# Patient Record
Sex: FEMALE | Race: WHITE | Employment: FULL TIME
[De-identification: names, ages, dates, MRNs, and addresses within clinical notes are randomized per-mention and may not be internally consistent; named-entity substitution may affect disease eponyms.]

---

## 2017-07-29 ENCOUNTER — HEALTH MAINTENANCE LETTER (OUTPATIENT)
Age: 42
End: 2017-07-29

## 2018-08-05 ENCOUNTER — HEALTH MAINTENANCE LETTER (OUTPATIENT)
Age: 43
End: 2018-08-05

## 2020-02-20 NOTE — PROGRESS NOTES
Subjective     Carina George is a 44 year old female who presents to clinic today for the following health issues:    History of Present Illness        Hypothyroidism:     Since last visit, patient describes the following symptoms::  Anxiety, Fatigue, Hair loss and Weight gain    Weight gain::  11-15 lbs.    She eats 4 or more servings of fruits and vegetables daily.She consumes 1 sweetened beverage(s) daily.She exercises with enough effort to increase her heart rate 30 to 60 minutes per day.  She exercises with enough effort to increase her heart rate 6 days per week.   She is taking medications regularly.     Currently taking levothyroxine. Noting more fatigue, hair loss, weight gain/hard to lose weight with frequent exercise. Has been slowly reducing her dose of levothyroxine over the years, began at 125 mg dose and most recently at 75 mg. Also has had some stress related issues in the past. Had undergone a divorce last year and had a recent move. She is working 10-12 hour days and working 7 days a week to pay her bills. Had taken Effexor and Zoloft prior but for years has only been taking clonazepam. Last prescription for clonazepam was a few months ago and uses about once every 3 days on average. Stopped Topamax recently and has not noted a significant increase in her migraines. Denies issues with abuse of or legal issues with prescriptions. Mentions that she had seen a counselor previously, but not at the current time.     Patient denies shortness of breath and has not found a need for inhalers. She has noted some ear and eye itchiness symptoms lately. Not currently taking any allergy medication.     Answers for HPI/ROS submitted by the patient on 2/24/2020   Chronic problems general questions HPI Form  If you checked off any problems, how difficult have these problems made it for you to do your work, take care of things at home, or get along with other people?: Somewhat difficult  PHQ9 TOTAL SCORE: 5  BRAYDON  "7 TOTAL SCORE: 3    Reviewed and updated as needed this visit by Provider  Tobacco  Allergies  Meds  Problems  Med Hx  Surg Hx  Fam Hx       Review of Systems   ROS COMP: Constitutional, HEENT, cardiovascular, pulmonary, GI, , musculoskeletal, neuro, skin, endocrine and psych systems are negative, except as otherwise noted.    This document serves as a record of the services and decisions personally performed and made by Kerri Man MD. It was created on her behalf by Joe Louis, a trained medical scribe. The creation of this document is based on the provider's statements to the medical scribe.  Joe Louis 3:54 PM February 24, 2020      Objective    /74 (BP Location: Left arm, Patient Position: Chair, Cuff Size: Adult Regular)   Pulse 82   Temp 98.4  F (36.9  C) (Temporal)   Resp 16   Ht 1.715 m (5' 7.5\")   Wt 92.4 kg (203 lb 12.8 oz)   SpO2 99%   Breastfeeding No   BMI 31.45 kg/m    Body mass index is 31.45 kg/m .  Physical Exam   GENERAL: healthy, alert and no distress  HENT: normal ear canals and TM's bilaterally, pale nasal turbinates   NECK: no adenopathy, no asymmetry, masses, or scars and thyroid normal to palpation  RESP: lungs clear to auscultation - no rales, rhonchi or wheezes  CV: regular rate and rhythm, normal S1 S2, no S3 or S4, no murmur, click or rub, no peripheral edema and peripheral pulses strong  SKIN: no suspicious lesions or rashes to visible skin   PSYCH: mentation appears normal, affect normal/bright      Assessment & Plan       ICD-10-CM    1. Encounter for routine adult health examination without abnormal findings Z00.00    2. Mild persistent asthma with exacerbation J45.31    3. Tobacco abuse Z72.0 varenicline (CHANTIX CONTINUING MONTH YENY) 1 MG tablet     varenicline (CHANTIX) 1 MG tablet   4. Screening for malignant neoplasm of cervix Z12.4    5. Acquired hypothyroidism E03.9 TSH with free T4 reflex   6. Anxiety F41.9 clonazePAM (KLONOPIN) 1 MG tablet   7. " Allergic rhinitis due to animals J30.81 fluticasone (FLONASE) 50 MCG/ACT nasal spray   Patient requests chantix for smoking cessation, denies significant history of asthma and has not needed her inhalers. Given info on quit plan as it is ordered, but she has had difficulty affording meds and this will help with the cost. Recheck labs today for thyroid and follow-up with patient if needing a change in her dose of levothyroxine. Symptoms could be stress related and patient having significant stress/anxiety symptoms due to life events. Divorce, move, financial - working 2 jobs. Continue current treatment with Clonazepam but recommended that she try to keep use limited to less than 10 pills/ month and follow-up with me in 1 month regarding the use of this medication. Ideally will have another long term plan if continuing to use so much rescue. Has some allergies acting up, Flonase nasal spray to be tried.     Tobacco Cessation:   reports that she has been smoking cigarettes. She has a 10.00 pack-year smoking history. She has never used smokeless tobacco.  Tobacco Cessation Action Plan: Pharmacotherapies : Chantix    See Patient Instructions    Return in about 1 month (around 3/24/2020) for mood.    The information in this document, created by the medical scribe for me, accurately reflects the services I personally performed and the decisions made by me. I have reviewed and approved this document for accuracy prior to leaving the patient care area.  February 24, 2020 4:14 PM     Kerri Man MD, MD  [facility]

## 2020-02-24 ENCOUNTER — OFFICE VISIT (OUTPATIENT)
Dept: FAMILY MEDICINE | Facility: OTHER | Age: 45
End: 2020-02-24
Payer: COMMERCIAL

## 2020-02-24 VITALS
SYSTOLIC BLOOD PRESSURE: 118 MMHG | DIASTOLIC BLOOD PRESSURE: 74 MMHG | HEART RATE: 82 BPM | HEIGHT: 68 IN | OXYGEN SATURATION: 99 % | TEMPERATURE: 98.4 F | WEIGHT: 203.8 LBS | BODY MASS INDEX: 30.89 KG/M2 | RESPIRATION RATE: 16 BRPM

## 2020-02-24 DIAGNOSIS — F41.9 ANXIETY: Primary | ICD-10-CM

## 2020-02-24 DIAGNOSIS — E03.9 ACQUIRED HYPOTHYROIDISM: ICD-10-CM

## 2020-02-24 DIAGNOSIS — Z12.4 SCREENING FOR MALIGNANT NEOPLASM OF CERVIX: ICD-10-CM

## 2020-02-24 DIAGNOSIS — Z72.0 TOBACCO ABUSE: ICD-10-CM

## 2020-02-24 DIAGNOSIS — J45.31 MILD PERSISTENT ASTHMA WITH EXACERBATION: ICD-10-CM

## 2020-02-24 DIAGNOSIS — J30.81 ALLERGIC RHINITIS DUE TO ANIMALS: ICD-10-CM

## 2020-02-24 LAB — TSH SERPL DL<=0.005 MIU/L-ACNC: 1.16 MU/L (ref 0.4–4)

## 2020-02-24 PROCEDURE — 99203 OFFICE O/P NEW LOW 30 MIN: CPT | Performed by: FAMILY MEDICINE

## 2020-02-24 PROCEDURE — 84443 ASSAY THYROID STIM HORMONE: CPT | Performed by: FAMILY MEDICINE

## 2020-02-24 PROCEDURE — 36415 COLL VENOUS BLD VENIPUNCTURE: CPT | Performed by: FAMILY MEDICINE

## 2020-02-24 RX ORDER — CLONAZEPAM 1 MG/1
1 TABLET ORAL DAILY
Qty: 10 TABLET | Refills: 0 | Status: SHIPPED | OUTPATIENT
Start: 2020-02-24 | End: 2021-02-22

## 2020-02-24 RX ORDER — FLUTICASONE PROPIONATE 50 MCG
2 SPRAY, SUSPENSION (ML) NASAL DAILY
Qty: 16 G | Refills: 1 | Status: SHIPPED | OUTPATIENT
Start: 2020-02-24 | End: 2020-05-01

## 2020-02-24 RX ORDER — LEVOTHYROXINE SODIUM 50 UG/1
50 TABLET ORAL DAILY
COMMUNITY
Start: 2020-02-04 | End: 2021-02-22

## 2020-02-24 RX ORDER — VARENICLINE TARTRATE 1 MG/1
1 TABLET, FILM COATED ORAL 2 TIMES DAILY
Qty: 60 TABLET | Refills: 1 | Status: SHIPPED | OUTPATIENT
Start: 2020-02-24 | End: 2021-02-01

## 2020-02-24 RX ORDER — VARENICLINE TARTRATE 1 MG/1
1 TABLET, FILM COATED ORAL 2 TIMES DAILY
Qty: 60 TABLET | Refills: 0 | Status: SHIPPED | OUTPATIENT
Start: 2020-02-24 | End: 2021-02-01

## 2020-02-24 ASSESSMENT — ANXIETY QUESTIONNAIRES
GAD7 TOTAL SCORE: 3
3. WORRYING TOO MUCH ABOUT DIFFERENT THINGS: NOT AT ALL
7. FEELING AFRAID AS IF SOMETHING AWFUL MIGHT HAPPEN: NOT AT ALL
6. BECOMING EASILY ANNOYED OR IRRITABLE: NOT AT ALL
1. FEELING NERVOUS, ANXIOUS, OR ON EDGE: SEVERAL DAYS
GAD7 TOTAL SCORE: 3
7. FEELING AFRAID AS IF SOMETHING AWFUL MIGHT HAPPEN: NOT AT ALL
5. BEING SO RESTLESS THAT IT IS HARD TO SIT STILL: SEVERAL DAYS
GAD7 TOTAL SCORE: 3
2. NOT BEING ABLE TO STOP OR CONTROL WORRYING: NOT AT ALL
4. TROUBLE RELAXING: SEVERAL DAYS

## 2020-02-24 ASSESSMENT — PATIENT HEALTH QUESTIONNAIRE - PHQ9
SUM OF ALL RESPONSES TO PHQ QUESTIONS 1-9: 5
10. IF YOU CHECKED OFF ANY PROBLEMS, HOW DIFFICULT HAVE THESE PROBLEMS MADE IT FOR YOU TO DO YOUR WORK, TAKE CARE OF THINGS AT HOME, OR GET ALONG WITH OTHER PEOPLE: SOMEWHAT DIFFICULT
SUM OF ALL RESPONSES TO PHQ QUESTIONS 1-9: 5

## 2020-02-24 ASSESSMENT — MIFFLIN-ST. JEOR: SCORE: 1624.99

## 2020-02-24 NOTE — LETTER
February 26, 2020      Carina George  [address] [place] [id number]        Dear Carina,     This letter is to inform you that your most recent labs were normal.    Results for orders placed or performed in visit on 02/24/20   TSH with free T4 reflex     Status: None   Result Value Ref Range    TSH 1.16 0.40 - 4.00 mU/L       Please call the clinic if you have any questions or concerns.    Thank you,   Team

## 2020-02-24 NOTE — PATIENT INSTRUCTIONS
Wean up on the Chantix as prescribed and pick a quit date. Take a look at the quit plan information given today.     There are resources for counseling if finances were ever an issue, please let me know if wanting to follow-up with a [facility] counselor as we do have services available here as well.     Try to limit klonopin below 10 per month. Follow-up with me regarding mood symptoms in about a month.     Flonase nasal spray for the allergies is recommended.

## 2020-02-25 ASSESSMENT — ASTHMA QUESTIONNAIRES: ACT_TOTALSCORE: 25

## 2020-02-25 ASSESSMENT — ANXIETY QUESTIONNAIRES: GAD7 TOTAL SCORE: 3

## 2020-02-25 ASSESSMENT — PATIENT HEALTH QUESTIONNAIRE - PHQ9: SUM OF ALL RESPONSES TO PHQ QUESTIONS 1-9: 5

## 2020-05-01 DIAGNOSIS — J30.81 ALLERGIC RHINITIS DUE TO ANIMALS: ICD-10-CM

## 2020-05-01 RX ORDER — FLUTICASONE PROPIONATE 50 MCG
SPRAY, SUSPENSION (ML) NASAL
Qty: 16 ML | Refills: 1 | Status: SHIPPED | OUTPATIENT
Start: 2020-05-01 | End: 2020-07-01

## 2020-05-01 NOTE — TELEPHONE ENCOUNTER
Pending Prescriptions:                       Disp   Refills    fluticasone (FLONASE) 50 MCG/ACT nasal sp*16 mL  1            Sig: INSTILL 2 SPRAYS INTO BOTH NOSTRILS DAILY    Prescription approved per [facility] Refill Protocol.    Silvia Carlson, MSN, RN

## 2020-07-01 DIAGNOSIS — J30.81 ALLERGIC RHINITIS DUE TO ANIMALS: ICD-10-CM

## 2020-07-01 RX ORDER — FLUTICASONE PROPIONATE 50 MCG
SPRAY, SUSPENSION (ML) NASAL
Qty: 16 ML | Refills: 0 | Status: SHIPPED | OUTPATIENT
Start: 2020-07-01 | End: 2020-07-15

## 2020-07-15 DIAGNOSIS — J30.81 ALLERGIC RHINITIS DUE TO ANIMALS: ICD-10-CM

## 2020-07-16 RX ORDER — FLUTICASONE PROPIONATE 50 MCG
SPRAY, SUSPENSION (ML) NASAL
Qty: 16 ML | Refills: 0 | Status: SHIPPED | OUTPATIENT
Start: 2020-07-16 | End: 2021-02-22

## 2020-07-16 NOTE — TELEPHONE ENCOUNTER
Prescription approved per [facility] Refill Protocol.    Dorota Cuellar, BSN, RN, PHN

## 2020-07-19 ENCOUNTER — HOSPITAL ENCOUNTER (EMERGENCY)
Facility: CLINIC | Age: 45
Discharge: HOME | End: 2020-07-19
Attending: NURSE PRACTITIONER | Admitting: NURSE PRACTITIONER
Payer: COMMERCIAL

## 2020-07-19 VITALS
HEIGHT: 68 IN | OXYGEN SATURATION: 99 % | TEMPERATURE: 97.8 F | SYSTOLIC BLOOD PRESSURE: 123 MMHG | DIASTOLIC BLOOD PRESSURE: 86 MMHG | BODY MASS INDEX: 30.31 KG/M2 | RESPIRATION RATE: 20 BRPM | WEIGHT: 200 LBS

## 2020-07-19 DIAGNOSIS — I83.899 RUPTURED VARICOSE VEIN: ICD-10-CM

## 2020-07-19 PROCEDURE — 99284 EMERGENCY DEPT VISIT MOD MDM: CPT | Mod: Z6 | Performed by: NURSE PRACTITIONER

## 2020-07-19 PROCEDURE — 99282 EMERGENCY DEPT VISIT SF MDM: CPT | Performed by: NURSE PRACTITIONER

## 2020-07-19 ASSESSMENT — MIFFLIN-ST. JEOR: SCORE: 1615.69

## 2020-07-19 NOTE — ED AVS SNAPSHOT
[facility] Emergency Department  [address] [place] [address]  Phone:  [phone]  Fax:  [phone]                                    Carina George   MRN: [id number]    Department:  [facility] Emergency Department   Date of Visit:  7/19/2020           After Visit Summary Signature Page    I have received my discharge instructions, and my questions have been answered. I have discussed any challenges I see with this plan with the nurse or doctor.    ..........................................................................................................................................  Patient/Patient Representative Signature      ..........................................................................................................................................  Patient Representative Print Name and Relationship to Patient    ..................................................               ................................................  Date                                   Time    ..........................................................................................................................................  Reviewed by Signature/Title    ...................................................              ..............................................  Date                                               Time          22EPIC Rev 08/18

## 2020-07-20 NOTE — DISCHARGE INSTRUCTIONS
Keep compressed with an Ace wrap for the next few days  You can alternate ibuprofen/Tylenol per bottle instructions for pain.  Ice as needed.  Do not leave on for more than 20 minutes at a time every 2-4 hours.  I have given you the number for vein [facility] in [place] that specializes in varicose veins.  You can certainly follow-up with them if you continue to have problems.

## 2020-07-20 NOTE — ED PROVIDER NOTES
History     Chief Complaint   Patient presents with     Leg Injury     HPI  Carina George is a 44 year old female who presents with bruising to a varicose vein that she noticed while getting out of the shower this evening.  Patient has a history of varicose veins, there is no active bleeding externally.  Patient denies any trauma but this is occurred more spontaneously.  Patient does report sensitivity/pain to the area which is worse with palpation.  Patient called the nurse line and they instructed patient to present to the emergency room for possible DVT.  Patient denies any unilateral leg swelling, history of DVT, shortness of breath or chest pain.  Patient has no DVT risk factors such as long travel or being sedentary or recent surgery.    Allergies:  Allergies   Allergen Reactions     Ibuprofen Hives, Itching and Swelling     11/2007: Patient has had both ibuprofen and morphine recently  without problems .     Morphine Hives       Problem List:    Patient Active Problem List    Diagnosis Date Noted     CARDIOVASCULAR SCREENING; LDL GOAL LESS THAN 160 10/31/2010     Priority: Medium     Mild persistent asthma with exacerbation 11/03/2009     Priority: Medium     Sinusitis 02/11/2009     Priority: Medium     persistent - but taking chronic afrin - stop.  do prednisone short course, start nasal steroid/antihistamine, f/u w ent if not improving       Tobacco use disorder 02/11/2009     Priority: Medium     w new black out episodes would not repeat chantix - even though it had helped prev.  also woudln't do wellbutrin - seiz risk.  trial patches again       Cough 12/15/2008     Priority: Medium     ongoing worsening despite zithromax, and restart asthma meds.  treat as asthma exacerb w poss recurrent pneumonia.  then test for underlying asthma.       Viral warts 02/21/2008     Priority: Medium     pared and froze today 2/21/08  Problem list name updated by automated process. Provider to review       Recurrent  major depressive disorder, in partial remission (H) 12/07/2007     Priority: Medium     depression with some OCD features - has sign improvement w sx's but still lingering - recommended psychol referral for more ocd coping strategies       Headache 12/07/2007     Priority: Medium     HA getting more frequent, waking her up, getting more severe - check SARA/MRA.  possibly using too much midrin, but associate w urinary ch and wt loss  Problem list name updated by automated process. Provider to review       Other isolated or specific phobias 12/07/2007     Priority: Medium     ativan for calustraphobia probable in MRI machine          Past Medical History:    Past Medical History:   Diagnosis Date     Depressive disorder, not elsewhere classified age 19     Dysplasia of cervix, unspecified      Female stress incontinence      Obesity, unspecified      Urinary tract infection, site not specified        Past Surgical History:    Past Surgical History:   Procedure Laterality Date     C EYE SERVICE OR PROCEDURE OPNP  2002    eye surgery     C OPEN RX ANKLE DISLOCATN+FIXATN  2002    Right     D & C  2002    Missed AB     HYSTERECTOMY, VAGINAL  12/2004    TVH, TVT procedure       Family History:    Family History   Problem Relation Age of Onset     Breast Cancer Maternal Aunt         in her 30's     Cancer Maternal Grandfather         Unsure of type     Neurologic Disorder Father         Brain tumor-? benign     Heart Disease Maternal Grandmother        Social History:  Marital Status:  Single [1]  Social History     Tobacco Use     Smoking status: Current Every Day Smoker     Packs/day: 1.00     Years: 10.00     Pack years: 10.00     Types: Cigarettes     Smokeless tobacco: Never Used     Tobacco comment: patient is down to a few ciggarrettes per day as of 12/9/2009, she is taking chantix   Substance Use Topics     Alcohol use: Yes     Comment: 6 drinks per month     Drug use: No        Medications:    clonazePAM (KLONOPIN)  "1 MG tablet  fluticasone (FLONASE) 50 MCG/ACT nasal spray  levothyroxine (SYNTHROID/LEVOTHROID) 50 MCG tablet  OMEPRAZOLE PO  varenicline (CHANTIX CONTINUING MONTH YENY) 1 MG tablet  varenicline (CHANTIX) 1 MG tablet  VITAMIN D (ERGOCALCIFEROL) OR          Review of Systems   All other systems reviewed and are negative.      Physical Exam   BP: 123/86  Heart Rate: 74  Temp: 97.8  F (36.6  C)  Resp: 20  Height: 172.7 cm (5' 8\")  Weight: 90.7 kg (200 lb)  SpO2: 99 %      Physical Exam  Constitutional:       Appearance: Normal appearance.   HENT:      Mouth/Throat:      Mouth: Mucous membranes are moist.   Eyes:      Extraocular Movements: Extraocular movements intact.   Neck:      Musculoskeletal: Normal range of motion.   Cardiovascular:      Rate and Rhythm: Normal rate and regular rhythm.   Pulmonary:      Effort: Pulmonary effort is normal.      Breath sounds: Normal breath sounds.   Skin:     General: Skin is warm.      Capillary Refill: Capillary refill takes less than 2 seconds.      Comments: Patient has ruptured varicosity extending approximately 5 cm in diameter to upper anterior medial thigh with several torturous varicose veins to remaining thigh and some to lower extremity, no significant swelling in either leg, distal pulses intact, negative Homans sign bilaterally   Neurological:      General: No focal deficit present.      Mental Status: She is alert.   Psychiatric:         Mood and Affect: Mood normal.         ED Course     Procedures    No results found for this or any previous visit (from the past 24 hour(s)).    Medications - No data to display    Assessments & Plan (with Medical Decision Making)  Ruptured varicose vein  Patient placed in Ace wrap for compression.  Given information for varicose vein treatment if she chooses to do this in the future.  Tylenol/ibuprofen as needed for pain, ice if she finds is helpful.  Patient has no signs or symptoms concerning for DVT, patient was reassured that " varicose veins are superficial but I was not concerned about a deep vein thrombosis at this time.  I would recommend Ace wrap for the next few days.  Elevation, rest.  Patient to follow-up with primary care as needed, reasons to return to the emergency room discussed.  Patient is agreeable to plan of care and discharged in stable condition.     I have reviewed the nursing notes.    I have reviewed the findings, diagnosis, plan and need for follow up with the patient.    New Prescriptions    No medications on file       Final diagnoses:   Ruptured varicose vein       7/19/2020   [facility] EMERGENCY DEPARTMENT     Amber Dee, RICHARD CNP  07/19/20 [id number]

## 2020-07-20 NOTE — ED TRIAGE NOTES
Pt in with a bruised area on the upper right thigh area and concern about other venous congestion in that leg

## 2020-07-27 ENCOUNTER — OFFICE VISIT (OUTPATIENT)
Dept: OBGYN | Facility: CLINIC | Age: 45
End: 2020-07-27
Payer: COMMERCIAL

## 2020-07-27 VITALS
SYSTOLIC BLOOD PRESSURE: 119 MMHG | HEIGHT: 68 IN | RESPIRATION RATE: 16 BRPM | TEMPERATURE: 97.7 F | DIASTOLIC BLOOD PRESSURE: 72 MMHG | BODY MASS INDEX: 30.58 KG/M2 | WEIGHT: 201.8 LBS | HEART RATE: 82 BPM

## 2020-07-27 DIAGNOSIS — N32.0 BLADDER OUTLET OBSTRUCTION: Primary | ICD-10-CM

## 2020-07-27 PROCEDURE — 99203 OFFICE O/P NEW LOW 30 MIN: CPT | Performed by: OBSTETRICS & GYNECOLOGY

## 2020-07-27 RX ORDER — TAMSULOSIN HYDROCHLORIDE 0.4 MG/1
0.4 CAPSULE ORAL DAILY
Qty: 30 CAPSULE | Refills: 5 | Status: SHIPPED | OUTPATIENT
Start: 2020-07-27 | End: 2021-02-01

## 2020-07-27 ASSESSMENT — MIFFLIN-ST. JEOR: SCORE: 1623.86

## 2020-07-27 NOTE — PROGRESS NOTES
Carina is a 44 year old   female who presents for advice regarding problems with her bladder; she has a h/o TOTAL VAGINAL HYSTERECTOMY and TVT done in  for JOSE ENRIQUE; she subsequently moved to [place], and underwent Dx laparoscopic in 3/14 due to lower abdominal cramping; she was told lysis of adhesions was performed; for the past 3 months, she has worsening lower abdominal cramping again, and dysfunctional emptying:  Feels like she is done, but when she goes to stand up, a sizeable amount of residual will appear.  In addition she has had some episodes of insensate leakage as well as leakage with urgency.  She does not feel anything protruding  She is not currently sexually active..    Patient Active Problem List    Diagnosis Date Noted     CARDIOVASCULAR SCREENING; LDL GOAL LESS THAN 160 10/31/2010     Priority: Medium     Mild persistent asthma with exacerbation 2009     Priority: Medium     Sinusitis 2009     Priority: Medium     persistent - but taking chronic afrin - stop.  do prednisone short course, start nasal steroid/antihistamine, f/u w ent if not improving       Tobacco use disorder 2009     Priority: Medium     w new black out episodes would not repeat chantix - even though it had helped prev.  also woudln't do wellbutrin - seiz risk.  trial patches again       Cough 12/15/2008     Priority: Medium     ongoing worsening despite zithromax, and restart asthma meds.  treat as asthma exacerb w poss recurrent pneumonia.  then test for underlying asthma.       Viral warts 2008     Priority: Medium     pared and froze today 08  Problem list name updated by automated process. Provider to review       Recurrent major depressive disorder, in partial remission (H) 2007     Priority: Medium     depression with some OCD features - has sign improvement w sx's but still lingering - recommended psychol referral for more ocd coping strategies       Headache 2007     Priority:  Medium     HA getting more frequent, waking her up, getting more severe - check SARA/MRA.  possibly using too much midrin, but associate w urinary ch and wt loss  Problem list name updated by automated process. Provider to review       Other isolated or specific phobias 12/07/2007     Priority: Medium     ativan for calustraphobia probable in MRI machine         All systems were reviewed and pertinent information in noted in subjective/HPI.    Past Medical History:   Diagnosis Date     Depressive disorder, not elsewhere classified age 19    Attempted suicide, hx Zoloft and Lexapro     Dysplasia of cervix, unspecified     JOSE CARLOS 3     Female stress incontinence      Obesity, unspecified      Urinary tract infection, site not specified     Recurrent UTI's       Past Surgical History:   Procedure Laterality Date     C EYE SERVICE OR PROCEDURE OPNP  2002    eye surgery     C OPEN RX ANKLE DISLOCATN+FIXATN  2002    Right     D & C  2002    Missed AB     HYSTERECTOMY, VAGINAL  12/2004    TVH, TVT procedure         Current Outpatient Medications:      clonazePAM (KLONOPIN) 1 MG tablet, Take 1 tablet (1 mg) by mouth daily, Disp: 10 tablet, Rfl: 0     fluticasone (FLONASE) 50 MCG/ACT nasal spray, INSTILL 2 SPRAYS INTO BOTH NOSTRILS DAILY, Disp: 16 mL, Rfl: 0     levothyroxine (SYNTHROID/LEVOTHROID) 50 MCG tablet, Take 50 mcg by mouth daily, Disp: , Rfl:      OMEPRAZOLE PO, None Entered, Disp: , Rfl:      tamsulosin (FLOMAX) 0.4 MG capsule, Take 1 capsule (0.4 mg) by mouth daily, Disp: 30 capsule, Rfl: 5     varenicline (CHANTIX) 1 MG tablet, Take 1 tablet (1 mg) by mouth 2 times daily, Disp: 60 tablet, Rfl: 0     VITAMIN D (ERGOCALCIFEROL) OR, None Entered, Disp: , Rfl:      varenicline (CHANTIX CONTINUING MONTH YENY) 1 MG tablet, Take 1 tablet (1 mg) by mouth 2 times daily, Disp: 60 tablet, Rfl: 1    ALLERGIES:  Ibuprofen and Morphine    Social History     Socioeconomic History     Marital status: Single     Spouse name: None  "    Number of children: 3     Years of education: None     Highest education level: None   Occupational History     Occupation: Nursing Student   Social Needs     Financial resource strain: None     Food insecurity     Worry: None     Inability: None     Transportation needs     Medical: None     Non-medical: None   Tobacco Use     Smoking status: Current Every Day Smoker     Packs/day: 1.00     Years: 10.00     Pack years: 10.00     Types: Cigarettes     Smokeless tobacco: Never Used     Tobacco comment: patient is down to a few ciggarrettes per day as of 12/9/2009, she is taking chantix   Substance and Sexual Activity     Alcohol use: Yes     Comment: 6 drinks per month     Drug use: No     Sexual activity: Yes     Partners: Male     Birth control/protection: Surgical     Comment: hyst   Lifestyle     Physical activity     Days per week: None     Minutes per session: None     Stress: None   Relationships     Social connections     Talks on phone: None     Gets together: None     Attends Buddhist service: None     Active member of club or organization: None     Attends meetings of clubs or organizations: None     Relationship status: None     Intimate partner violence     Fear of current or ex partner: None     Emotionally abused: None     Physically abused: None     Forced sexual activity: None   Other Topics Concern     Parent/sibling w/ CABG, MI or angioplasty before 65F 55M? No   Social History Narrative     None       Family History   Problem Relation Age of Onset     Cancer Maternal Grandfather         Unsure of type     Neurologic Disorder Father         Brain tumor-? benign     Lupus Mother      Heart Disease Maternal Grandmother      Breast Cancer Maternal Aunt         in her 30's       OBJECTIVE:  Vitals: /72 (BP Location: Right arm, Patient Position: Chair, Cuff Size: Adult Regular)   Pulse 82   Temp 97.7  F (36.5  C) (Tympanic)   Resp 16   Ht 1.727 m (5' 8\")   Wt 91.5 kg (201 lb 12.8 oz)   " Breastfeeding No   BMI 30.68 kg/m   BMI= Body mass index is 30.68 kg/m .   No LMP recorded. Patient has had a hysterectomy.     GENERAL APPEARANCE: healthy, alert and no distress  ABDOMEN:  soft, nontender, no hepato-splenomegaly or hernias  PELVIC:  EGBUS:  Normal appearing; no leakage or movement of UV angle with Valsalva or cough  VAGINA:  Reasonably well supported globally, including anterior, apical and posterior compartments; moderate estrogenization; tender anteriorly, under blader  CERVIX:  absent  UTERUS:  absent  ADNEXAE:   No masses, ontender    Pt unable to void so unable to have POSTVOID RESIDUAL assessed.    ASSESSMENT:      ICD-10-CM    1. Bladder outlet obstruction  N32.0 tamsulosin (FLOMAX) 0.4 MG capsule       PLAN:  I suspect based upon her symptoms, that she has partial bladder outlet obstruction, likely due to the sling and changes with aging.  I discussed a trial of Flomax 0.4-0.8mg daily to see if improvement in symptoms  If no improvement, then order urodynamics study  May have to consider self cathing    Jacklyn Moses MD    Duration of visit:  30 minutes, >50% in discussion of current issues, treatment options and treatment planning.  JACKLYN MOSES MD

## 2020-07-27 NOTE — NURSING NOTE
"Initial /72 (BP Location: Right arm, Patient Position: Chair, Cuff Size: Adult Regular)   Pulse 82   Temp 97.7  F (36.5  C) (Tympanic)   Resp 16   Ht 1.727 m (5' 8\")   Wt 91.5 kg (201 lb 12.8 oz)   Breastfeeding No   BMI 30.68 kg/m   Estimated body mass index is 30.68 kg/m  as calculated from the following:    Height as of this encounter: 1.727 m (5' 8\").    Weight as of this encounter: 91.5 kg (201 lb 12.8 oz). .    Rivka Odell, RASHID    "

## 2020-11-06 ENCOUNTER — HOSPITAL ENCOUNTER (EMERGENCY)
Facility: CLINIC | Age: 45
Discharge: HOME | End: 2020-11-06
Attending: FAMILY MEDICINE | Admitting: FAMILY MEDICINE
Payer: COMMERCIAL

## 2020-11-06 VITALS
TEMPERATURE: 99.8 F | HEART RATE: 97 BPM | RESPIRATION RATE: 18 BRPM | BODY MASS INDEX: 27.67 KG/M2 | DIASTOLIC BLOOD PRESSURE: 70 MMHG | SYSTOLIC BLOOD PRESSURE: 102 MMHG | OXYGEN SATURATION: 97 % | WEIGHT: 182 LBS

## 2020-11-06 LAB
ALBUMIN SERPL-MCNC: 3.7 G/DL (ref 3.4–5)
ALP SERPL-CCNC: 95 U/L (ref 40–150)
ALT SERPL W P-5'-P-CCNC: 33 U/L (ref 0–50)
ANION GAP SERPL CALCULATED.3IONS-SCNC: 5 MMOL/L (ref 3–14)
AST SERPL W P-5'-P-CCNC: 24 U/L (ref 0–45)
BASOPHILS # BLD AUTO: 0 10E9/L (ref 0–0.2)
BASOPHILS NFR BLD AUTO: 0.8 %
BILIRUB SERPL-MCNC: 0.2 MG/DL (ref 0.2–1.3)
BUN SERPL-MCNC: 8 MG/DL (ref 7–30)
CALCIUM SERPL-MCNC: 8.7 MG/DL (ref 8.5–10.1)
CHLORIDE SERPL-SCNC: 111 MMOL/L (ref 94–109)
CO2 SERPL-SCNC: 26 MMOL/L (ref 20–32)
CREAT SERPL-MCNC: 0.89 MG/DL (ref 0.52–1.04)
DIFFERENTIAL METHOD BLD: ABNORMAL
EOSINOPHIL # BLD AUTO: 0.1 10E9/L (ref 0–0.7)
EOSINOPHIL NFR BLD AUTO: 2.6 %
ERYTHROCYTE [DISTWIDTH] IN BLOOD BY AUTOMATED COUNT: 12.6 % (ref 10–15)
GFR SERPL CREATININE-BSD FRML MDRD: 79 ML/MIN/{1.73_M2}
GLUCOSE SERPL-MCNC: 97 MG/DL (ref 70–99)
HCT VFR BLD AUTO: 44.9 % (ref 35–47)
HGB BLD-MCNC: 15.3 G/DL (ref 11.7–15.7)
IMM GRANULOCYTES # BLD: 0 10E9/L (ref 0–0.4)
IMM GRANULOCYTES NFR BLD: 0 %
LYMPHOCYTES # BLD AUTO: 0.7 10E9/L (ref 0.8–5.3)
LYMPHOCYTES NFR BLD AUTO: 18.3 %
MCH RBC QN AUTO: 33 PG (ref 26.5–33)
MCHC RBC AUTO-ENTMCNC: 34.1 G/DL (ref 31.5–36.5)
MCV RBC AUTO: 97 FL (ref 78–100)
MONOCYTES # BLD AUTO: 0.5 10E9/L (ref 0–1.3)
MONOCYTES NFR BLD AUTO: 12.3 %
NEUTROPHILS # BLD AUTO: 2.6 10E9/L (ref 1.6–8.3)
NEUTROPHILS NFR BLD AUTO: 66 %
NRBC # BLD AUTO: 0 10*3/UL
NRBC BLD AUTO-RTO: 0 /100
PLATELET # BLD AUTO: 223 10E9/L (ref 150–450)
POTASSIUM SERPL-SCNC: 3.8 MMOL/L (ref 3.4–5.3)
PROT SERPL-MCNC: 6.8 G/DL (ref 6.8–8.8)
RBC # BLD AUTO: 4.63 10E12/L (ref 3.8–5.2)
SODIUM SERPL-SCNC: 142 MMOL/L (ref 133–144)
WBC # BLD AUTO: 3.9 10E9/L (ref 4–11)

## 2020-11-06 PROCEDURE — 999N000104 HC STATISTIC NO CHARGE: Performed by: FAMILY MEDICINE

## 2020-11-06 PROCEDURE — 85025 COMPLETE CBC W/AUTO DIFF WBC: CPT | Performed by: EMERGENCY MEDICINE

## 2020-11-06 PROCEDURE — 80053 COMPREHEN METABOLIC PANEL: CPT | Performed by: EMERGENCY MEDICINE

## 2021-02-01 ENCOUNTER — OFFICE VISIT (OUTPATIENT)
Dept: FAMILY MEDICINE | Facility: CLINIC | Age: 46
End: 2021-02-01
Payer: COMMERCIAL

## 2021-02-01 VITALS
HEIGHT: 67 IN | OXYGEN SATURATION: 100 % | DIASTOLIC BLOOD PRESSURE: 80 MMHG | SYSTOLIC BLOOD PRESSURE: 118 MMHG | TEMPERATURE: 96.3 F | HEART RATE: 79 BPM | BODY MASS INDEX: 29.35 KG/M2 | WEIGHT: 187 LBS | RESPIRATION RATE: 16 BRPM

## 2021-02-01 DIAGNOSIS — E03.9 HYPOTHYROIDISM (ACQUIRED): ICD-10-CM

## 2021-02-01 DIAGNOSIS — R23.2 HOT FLASHES: Primary | ICD-10-CM

## 2021-02-01 PROBLEM — Z90.710 HISTORY OF TOTAL HYSTERECTOMY: Status: ACTIVE | Noted: 2021-02-01

## 2021-02-01 LAB
FSH SERPL-ACNC: 88.7 IU/L
PROLACTIN SERPL-MCNC: 10 UG/L (ref 3–27)
TSH SERPL DL<=0.005 MIU/L-ACNC: 1.08 MU/L (ref 0.4–4)

## 2021-02-01 PROCEDURE — 84146 ASSAY OF PROLACTIN: CPT | Performed by: FAMILY MEDICINE

## 2021-02-01 PROCEDURE — 84443 ASSAY THYROID STIM HORMONE: CPT | Performed by: FAMILY MEDICINE

## 2021-02-01 PROCEDURE — 36415 COLL VENOUS BLD VENIPUNCTURE: CPT | Performed by: FAMILY MEDICINE

## 2021-02-01 PROCEDURE — 83001 ASSAY OF GONADOTROPIN (FSH): CPT | Performed by: FAMILY MEDICINE

## 2021-02-01 PROCEDURE — 99213 OFFICE O/P EST LOW 20 MIN: CPT | Performed by: FAMILY MEDICINE

## 2021-02-01 RX ORDER — SUMATRIPTAN 50 MG/1
50 TABLET, FILM COATED ORAL
COMMUNITY
End: 2021-02-22 | Stop reason: DRUGHIGH

## 2021-02-01 RX ORDER — PROMETHAZINE HYDROCHLORIDE 25 MG/1
25 TABLET ORAL EVERY 6 HOURS PRN
COMMUNITY
Start: 2020-05-06

## 2021-02-01 ASSESSMENT — PATIENT HEALTH QUESTIONNAIRE - PHQ9
SUM OF ALL RESPONSES TO PHQ QUESTIONS 1-9: 8
5. POOR APPETITE OR OVEREATING: MORE THAN HALF THE DAYS

## 2021-02-01 ASSESSMENT — ANXIETY QUESTIONNAIRES
2. NOT BEING ABLE TO STOP OR CONTROL WORRYING: MORE THAN HALF THE DAYS
1. FEELING NERVOUS, ANXIOUS, OR ON EDGE: MORE THAN HALF THE DAYS
6. BECOMING EASILY ANNOYED OR IRRITABLE: MORE THAN HALF THE DAYS
5. BEING SO RESTLESS THAT IT IS HARD TO SIT STILL: NEARLY EVERY DAY
IF YOU CHECKED OFF ANY PROBLEMS ON THIS QUESTIONNAIRE, HOW DIFFICULT HAVE THESE PROBLEMS MADE IT FOR YOU TO DO YOUR WORK, TAKE CARE OF THINGS AT HOME, OR GET ALONG WITH OTHER PEOPLE: VERY DIFFICULT
7. FEELING AFRAID AS IF SOMETHING AWFUL MIGHT HAPPEN: NOT AT ALL
3. WORRYING TOO MUCH ABOUT DIFFERENT THINGS: NEARLY EVERY DAY
GAD7 TOTAL SCORE: 14

## 2021-02-01 ASSESSMENT — MIFFLIN-ST. JEOR: SCORE: 1527.92

## 2021-02-01 NOTE — PROGRESS NOTES
"  Assessment & Plan     Hot flashes  History of total hysterectomy without oophorectomy.   Could be related to postmenopause although would be early for the patient as she is only 45 years old. Will check Prolactin level and also FSH. S/P total hysterectomy. Discussed hormonal vs non-hormonal treatment with patient.   - Prolactin  - Follicle stimulating hormone    Hypothyroidism (acquired)  Currently on Levothyroxine 50 mcg daily. Taking it appropriately. Check TSH today.    - TSH with free T4 reflex      Srinivasa Jack DO  [facility]    Nova Lim is a 45 year old who presents to clinic today for the following health issues     HPI       Hypothyroidism Follow-up      Since last visit, patient describes the following symptoms: fatigue-not sleeping due to hat flashes, wants her thyroid and hormones checked.    Currently on Levothyroxine. Due to TSH check. Reports in past had similar symptoms as now and Thyroid dose was too high. Also wants hormone levels checked as is s/p total vaginal hysterectomy 12/21/2004 for severe cervical dysplasia. Still has ovaries. FSH and prolactin checked in 2018 and within normal limits. Denies vaginal bleeding.       How many servings of fruits and vegetables do you eat daily?  0-1    On average, how many sweetened beverages do you drink each day (Examples: soda, juice, sweet tea, etc.  Do NOT count diet or artificially sweetened beverages)?   2 monster drinks    How many days per week do you exercise enough to make your heart beat faster? 3 or less    How many minutes a day do you exercise enough to make your heart beat faster? 9 or less    How many days per week do you miss taking your medication? 0      Review of Systems         Objective    /80 (BP Location: Right arm, Patient Position: Chair, Cuff Size: Adult Regular)   Pulse 79   Temp 96.3  F (35.7  C) (Tympanic)   Resp 16   Ht 1.689 m (5' 6.5\")   Wt 84.8 kg (187 lb)   LMP  (LMP " Unknown)   SpO2 100%   BMI 29.73 kg/m    Body mass index is 29.73 kg/m .  Physical Exam   General: alert, cooperative, no acute distress   Neck: supple. No thyroid nodules appreciated.   CV: RRR, no murmur  Resp: non-labored breathing, clear to auscultation, no wheezing or rales   Abdomen: Soft, non-tender, no guarding.   Extremities: No peripheral edema, calves non-tender.

## 2021-02-01 NOTE — PATIENT INSTRUCTIONS
Check labs today.     Discuss treatment of hot flashes after labs.       Thank you for choosing [facility].  You may be receiving an email and/or telephone survey request from [facility] Customer Experience regarding your visit today.  Please take a few minutes to respond to the survey to let us know how we are doing.      If you have questions or concerns, please contact us via [employer] or you can contact your care team at [phone].    Our Clinic hours are:  Monday 6:40 am  to 7:00 pm  Tuesday -Friday 6:40 am to 5:00 pm    The [place] outpatient lab hours are:  Monday - Friday 6:10 am to 4:45 pm  Saturdays 7:00 am to 11:00 am  Appointments are required, call [phone]    If you have clinical questions after hours or would like to schedule an appointment,  call the clinic at [phone].

## 2021-02-02 DIAGNOSIS — N95.1 MENOPAUSAL SYNDROME (HOT FLASHES): Primary | ICD-10-CM

## 2021-02-02 RX ORDER — ESTRADIOL 0.04 MG/D
1 PATCH TRANSDERMAL WEEKLY
Qty: 4 PATCH | Refills: 0 | Status: SHIPPED | OUTPATIENT
Start: 2021-02-02 | End: 2021-02-22

## 2021-02-02 ASSESSMENT — ANXIETY QUESTIONNAIRES: GAD7 TOTAL SCORE: 14

## 2021-02-02 ASSESSMENT — ASTHMA QUESTIONNAIRES: ACT_TOTALSCORE: 25

## 2021-02-02 NOTE — RESULT ENCOUNTER NOTE
Please call patient. Prolactin level is normal, TSH normal, FSH elevated in postmenopausal range. I attempted to call patient to further discuss hormonal vs non-hormonal treatment of hot flashes in menopause.

## 2021-02-08 ENCOUNTER — TELEPHONE (OUTPATIENT)
Dept: FAMILY MEDICINE | Facility: CLINIC | Age: 46
End: 2021-02-08

## 2021-02-08 NOTE — TELEPHONE ENCOUNTER
Reason for Call:  Other prescription    Detailed comments: pt estradiol patch feel off in shower after x2 days of being applied. Pt applied a new one and will now be short on her Rx.   Pt also asking if the hot flashes should getting better since its been 5-6 days.    Phone Number Patient can be reached at: Home number on file [phone] (home)    Best Time: any    Can we leave a detailed message on this number? YES    Call taken on 2/8/2021 at 12:56 PM by Geeta De La Cruz

## 2021-02-08 NOTE — TELEPHONE ENCOUNTER
Called the patient and she is to f/u with Dr. Jack in 2-4 weeks to see how hormonal tx is going.  We can address the patch falling off then.  She will need refills anyway. Millicent ANDRADE RN

## 2021-02-22 ENCOUNTER — OFFICE VISIT (OUTPATIENT)
Dept: FAMILY MEDICINE | Facility: CLINIC | Age: 46
End: 2021-02-22
Payer: COMMERCIAL

## 2021-02-22 VITALS
RESPIRATION RATE: 16 BRPM | WEIGHT: 186.8 LBS | TEMPERATURE: 97.8 F | DIASTOLIC BLOOD PRESSURE: 70 MMHG | HEIGHT: 68 IN | HEART RATE: 73 BPM | BODY MASS INDEX: 28.31 KG/M2 | OXYGEN SATURATION: 97 % | SYSTOLIC BLOOD PRESSURE: 102 MMHG

## 2021-02-22 DIAGNOSIS — Z13.1 SCREENING FOR DIABETES MELLITUS: ICD-10-CM

## 2021-02-22 DIAGNOSIS — Z13.6 CARDIOVASCULAR SCREENING; LDL GOAL LESS THAN 100: ICD-10-CM

## 2021-02-22 DIAGNOSIS — E03.9 HYPOTHYROIDISM (ACQUIRED): ICD-10-CM

## 2021-02-22 DIAGNOSIS — Z00.00 ROUTINE GENERAL MEDICAL EXAMINATION AT A HEALTH CARE FACILITY: Primary | ICD-10-CM

## 2021-02-22 DIAGNOSIS — Z80.3 FAMILY HISTORY OF MALIGNANT NEOPLASM OF BREAST: ICD-10-CM

## 2021-02-22 DIAGNOSIS — R30.0 DYSURIA: ICD-10-CM

## 2021-02-22 DIAGNOSIS — Z12.31 ENCOUNTER FOR SCREENING MAMMOGRAM FOR BREAST CANCER: ICD-10-CM

## 2021-02-22 DIAGNOSIS — J30.81 ALLERGIC RHINITIS DUE TO ANIMALS: ICD-10-CM

## 2021-02-22 DIAGNOSIS — K21.00 GASTROESOPHAGEAL REFLUX DISEASE WITH ESOPHAGITIS, UNSPECIFIED WHETHER HEMORRHAGE: ICD-10-CM

## 2021-02-22 DIAGNOSIS — Z23 NEED FOR PROPHYLACTIC VACCINATION AND INOCULATION AGAINST INFLUENZA: ICD-10-CM

## 2021-02-22 DIAGNOSIS — R09.81 NASAL CONGESTION: ICD-10-CM

## 2021-02-22 DIAGNOSIS — G43.909 MIGRAINE WITHOUT STATUS MIGRAINOSUS, NOT INTRACTABLE, UNSPECIFIED MIGRAINE TYPE: ICD-10-CM

## 2021-02-22 PROCEDURE — 90471 IMMUNIZATION ADMIN: CPT | Performed by: FAMILY MEDICINE

## 2021-02-22 PROCEDURE — 99214 OFFICE O/P EST MOD 30 MIN: CPT | Mod: 25 | Performed by: FAMILY MEDICINE

## 2021-02-22 PROCEDURE — 99396 PREV VISIT EST AGE 40-64: CPT | Mod: 25 | Performed by: FAMILY MEDICINE

## 2021-02-22 PROCEDURE — 90686 IIV4 VACC NO PRSV 0.5 ML IM: CPT | Performed by: FAMILY MEDICINE

## 2021-02-22 RX ORDER — OMEPRAZOLE 40 MG/1
40 CAPSULE, DELAYED RELEASE ORAL EVERY MORNING
Qty: 90 CAPSULE | Refills: 3 | Status: SHIPPED | OUTPATIENT
Start: 2021-02-22

## 2021-02-22 RX ORDER — NITROFURANTOIN 25; 75 MG/1; MG/1
100 CAPSULE ORAL 2 TIMES DAILY
COMMUNITY
End: 2022-01-25

## 2021-02-22 RX ORDER — FLUTICASONE PROPIONATE 50 MCG
SPRAY, SUSPENSION (ML) NASAL
Qty: 48 ML | Refills: 3 | Status: SHIPPED | OUTPATIENT
Start: 2021-02-22

## 2021-02-22 RX ORDER — LEVOTHYROXINE SODIUM 50 UG/1
50 TABLET ORAL DAILY
Qty: 90 TABLET | Refills: 3 | Status: SHIPPED | OUTPATIENT
Start: 2021-02-22 | End: 2022-05-27

## 2021-02-22 RX ORDER — SUMATRIPTAN 100 MG/1
100 TABLET, FILM COATED ORAL
Qty: 27 TABLET | Refills: 3 | Status: SHIPPED | OUTPATIENT
Start: 2021-02-22

## 2021-02-22 ASSESSMENT — MIFFLIN-ST. JEOR: SCORE: 1550.82
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
## 2021-02-22 NOTE — PATIENT INSTRUCTIONS
Please get your mammogram done.    Please get your CT scan of your lungs from [facility].    Your next upper endoscopy would likely be next year since it would be every 5 years.    I refilled your medications.    I have increase your sumatriptan to 100 mg from the 50 mg dose.    Please make a fasting lab visit.    Please be aware that there will be an additional charge during your preventative visit due to either a new diagnosis and/or chronic disease management.    Preventative visits screen for diseases prior to they occur.  They do not cover for any new diagnosis or chronic disease management which would include medication refills, labs etc.    If you have questions regarding your coverage please check with your insurance provider.  At [facility] we need to code correctly to be in compliance with all insurance companies.          Thank you for choosing [facility].  You may be receiving an email and/or telephone survey request from [facility] Customer Experience regarding your visit today.  Please take a few minutes to respond to the survey to let us know how we are doing.      If you have questions or concerns, please contact us via [employer] or you can contact your care team at [phone].    Our Clinic hours are:  Monday 6:40 am  to 7:00 pm  Tuesday -Friday 6:40 am to 5:00 pm    The [place] outpatient lab hours are:  Monday - Friday 6:10 am to 4:45 pm  Saturdays 7:00 am to 11:00 am  Appointments are required, call [phone]    If you have clinical questions after hours or would like to schedule an appointment,  call the clinic at [phone].    Preventive Health Recommendations  Female Ages 40 to 49    Yearly exam:     See your health care provider every year in order to  1. Review health changes.   2. Discuss preventive care.    3. Review your medicines if your doctor prescribed any.      Get a Pap test every three years (unless you have an abnormal result and your provider advises testing more  often).      If you get Pap tests with HPV test, you only need to test every 5 years, unless you have an abnormal result. You do not need a Pap test if your uterus was removed (hysterectomy) and you have not had cancer.      You should be tested each year for STDs (sexually transmitted diseases), if you're at risk.     Ask your doctor if you should have a mammogram.      Have a colonoscopy (test for colon cancer) if someone in your family has had colon cancer or polyps before age 50.       Have a cholesterol test every 5 years.       Have a diabetes test (fasting glucose) after age 45. If you are at risk for diabetes, you should have this test every 3 years.    Shots: Get a flu shot each year. Get a tetanus shot every 10 years.     Nutrition:     Eat at least 5 servings of fruits and vegetables each day.    Eat whole-grain bread, whole-wheat pasta and brown rice instead of white grains and rice.    Get adequate Calcium and Vitamin D.      Lifestyle    Exercise at least 150 minutes a week (an average of 30 minutes a day, 5 days a week). This will help you control your weight and prevent disease.    Limit alcohol to one drink per day.    No smoking.     Wear sunscreen to prevent skin cancer.    See your dentist every six months for an exam and cleaning.

## 2021-11-28 ENCOUNTER — HOSPITAL ENCOUNTER (EMERGENCY)
Facility: CLINIC | Age: 46
Discharge: HOME | End: 2021-11-28
Attending: FAMILY MEDICINE | Admitting: FAMILY MEDICINE
Payer: COMMERCIAL

## 2021-11-28 VITALS
RESPIRATION RATE: 16 BRPM | BODY MASS INDEX: 28.79 KG/M2 | OXYGEN SATURATION: 99 % | HEIGHT: 68 IN | HEART RATE: 69 BPM | WEIGHT: 190 LBS | TEMPERATURE: 97.3 F | DIASTOLIC BLOOD PRESSURE: 76 MMHG | SYSTOLIC BLOOD PRESSURE: 134 MMHG

## 2021-11-28 DIAGNOSIS — R51.9 ACUTE INTRACTABLE HEADACHE, UNSPECIFIED HEADACHE TYPE: ICD-10-CM

## 2021-11-28 PROCEDURE — 96365 THER/PROPH/DIAG IV INF INIT: CPT | Performed by: FAMILY MEDICINE

## 2021-11-28 PROCEDURE — 99284 EMERGENCY DEPT VISIT MOD MDM: CPT | Mod: 25 | Performed by: FAMILY MEDICINE

## 2021-11-28 PROCEDURE — 258N000003 HC RX IP 258 OP 636: Performed by: FAMILY MEDICINE

## 2021-11-28 PROCEDURE — 250N000011 HC RX IP 250 OP 636: Performed by: FAMILY MEDICINE

## 2021-11-28 PROCEDURE — 96375 TX/PRO/DX INJ NEW DRUG ADDON: CPT | Performed by: FAMILY MEDICINE

## 2021-11-28 PROCEDURE — 99284 EMERGENCY DEPT VISIT MOD MDM: CPT | Performed by: FAMILY MEDICINE

## 2021-11-28 RX ORDER — KETOROLAC TROMETHAMINE 30 MG/ML
15 INJECTION, SOLUTION INTRAMUSCULAR; INTRAVENOUS ONCE
Status: COMPLETED | OUTPATIENT
Start: 2021-11-28 | End: 2021-11-28

## 2021-11-28 RX ORDER — MAGNESIUM SULFATE 1 G/100ML
1 INJECTION INTRAVENOUS ONCE
Status: COMPLETED | OUTPATIENT
Start: 2021-11-28 | End: 2021-11-28

## 2021-11-28 RX ORDER — METOCLOPRAMIDE HYDROCHLORIDE 5 MG/ML
10 INJECTION INTRAMUSCULAR; INTRAVENOUS ONCE
Status: COMPLETED | OUTPATIENT
Start: 2021-11-28 | End: 2021-11-28

## 2021-11-28 RX ORDER — DIPHENHYDRAMINE HYDROCHLORIDE 50 MG/ML
25 INJECTION INTRAMUSCULAR; INTRAVENOUS ONCE
Status: COMPLETED | OUTPATIENT
Start: 2021-11-28 | End: 2021-11-28

## 2021-11-28 RX ADMIN — KETOROLAC TROMETHAMINE 15 MG: 30 INJECTION, SOLUTION INTRAMUSCULAR at 04:06

## 2021-11-28 RX ADMIN — METOCLOPRAMIDE 10 MG: 5 INJECTION, SOLUTION INTRAMUSCULAR; INTRAVENOUS at 04:09

## 2021-11-28 RX ADMIN — SODIUM CHLORIDE 1000 ML: 9 INJECTION, SOLUTION INTRAVENOUS at 04:06

## 2021-11-28 RX ADMIN — MAGNESIUM SULFATE HEPTAHYDRATE 1 G: 1 INJECTION, SOLUTION INTRAVENOUS at 04:17

## 2021-11-28 RX ADMIN — DIPHENHYDRAMINE HYDROCHLORIDE 25 MG: 50 INJECTION, SOLUTION INTRAMUSCULAR; INTRAVENOUS at 04:07

## 2021-11-28 ASSESSMENT — MIFFLIN-ST. JEOR: SCORE: 1565.33

## 2021-11-28 NOTE — DISCHARGE INSTRUCTIONS
We are glad that you are feeling better after the headache medications.  Go home and sleep in a cool, dark room.  Resume your Imitrex and promethazine.  Contact your primary clinic provider to get a refill.  Return to the emergency department if your symptoms worsen.

## 2021-11-28 NOTE — ED PROVIDER NOTES
[facility] ED Provider Note   Patient: Carina George  MRN #:  [id number]  Date of Visit: November 28, 2021    CC:     Chief Complaint   Patient presents with     Headache     Pt states HA started at 2000 yesterday. States she ran out of her medications.     HPI:  Carina George is a 45 year old female who presented to the emergency department with acute intractable migraine type headache that started around 8 PM Saturday night.  Patient had run out of her Imitrex and promethazine, and did not have anything to take.  Her headache pain level has increased.  Pain is located behind the right eye, with light sensitivity, nausea and vomiting.  She states that this is more intense than her typical migraine type headache.  She usually responds to Imitrex 100 mg tablet and promethazine for nausea, but her headaches seem to come back the next day.  Patient states that she is menopausal, and occasionally has hot flashes but does not have any fever, neck pain or stiffness, sore throat, etc.  There has been no known trigger for her current headache.  She has not had come to the emergency department for over a year.    Problem List:  Patient Active Problem List    Diagnosis Date Noted     Menopausal syndrome (hot flashes) 02/02/2021     Priority: Medium     History of total hysterectomy 02/01/2021     Priority: Medium     hyst for precancerous lesion       Hypothyroidism (acquired) 05/09/2018     Priority: Medium     CARDIOVASCULAR SCREENING; LDL GOAL LESS THAN 160 10/31/2010     Priority: Medium     Mild persistent asthma with exacerbation 11/03/2009     Priority: Medium     Sinusitis 02/11/2009     Priority: Medium     persistent - but taking chronic afrin - stop.  do prednisone short course, start nasal steroid/antihistamine, f/u w ent if not improving       Tobacco use disorder 02/11/2009     Priority: Medium     w new black out episodes would not  repeat chantix - even though it had helped prev.  also woudln't do wellbutrin - seiz risk.  trial patches again       Cough 12/15/2008     Priority: Medium     ongoing worsening despite zithromax, and restart asthma meds.  treat as asthma exacerb w poss recurrent pneumonia.  then test for underlying asthma.       Viral warts 02/21/2008     Priority: Medium     pared and froze today 2/21/08  Problem list name updated by automated process. Provider to review       Recurrent major depressive disorder, in partial remission (H) 12/07/2007     Priority: Medium     depression with some OCD features - has sign improvement w sx's but still lingering - recommended psychol referral for more ocd coping strategies       Headache 12/07/2007     Priority: Medium     HA getting more frequent, waking her up, getting more severe - check SARA/MRA.  possibly using too much midrin, but associate w urinary ch and wt loss  Problem list name updated by automated process. Provider to review       Other isolated or specific phobias 12/07/2007     Priority: Medium     ativan for calustraphobia probable in MRI machine         Past Medical History:   Diagnosis Date     Depressive disorder, not elsewhere classified age 19     Dysplasia of cervix, unspecified      Female stress incontinence      Migraines      Obesity, unspecified      Urinary tract infection, site not specified        MEDS: Cyanocobalamin (B-12) 1000 MCG TBCR  fluticasone (FLONASE) 50 MCG/ACT nasal spray  levothyroxine (SYNTHROID/LEVOTHROID) 50 MCG tablet  MAGNESIUM GLYCINATE PO  nitroFURantoin macrocrystal-monohydrate (MACROBID) 100 MG capsule  omeprazole (PRILOSEC) 40 MG DR capsule  Potassium (POTASSIMIN PO)  promethazine (PHENERGAN) 25 MG tablet  Rhubarb (ESTROVEN COMPLETE PO)  SUMAtriptan (IMITREX) 100 MG tablet  VITAMIN D (ERGOCALCIFEROL) OR  Zinc Acetate, Oral, (ZINC ACETATE PO)        ALLERGIES:  No Known Allergies    Past Surgical History:   Procedure Laterality Date      "C EYE SERVICE OR PROCEDURE OPNP  2002    eye surgery     C OPEN RX ANKLE DISLOCATN+FIXATN  2002    Right     D & C  2002    Missed AB     HYSTERECTOMY, VAGINAL  12/2004    TVH, TVT procedure       Social History     Tobacco Use     Smoking status: Current Every Day Smoker     Packs/day: 1.00     Years: 15.00     Pack years: 15.00     Types: Cigarettes     Smokeless tobacco: Never Used     Tobacco comment: patient is down to a few ciggarrettes per day as of 12/9/2009, she is taking chantix   Substance Use Topics     Alcohol use: Yes     Comment: 6 drinks per month     Drug use: No         Review of Systems   Except as noted in HPI, all other systems were reviewed and are negative    Physical Exam     Vitals were reviewed  Patient Vitals for the past 12 hrs:   BP Temp Temp src Pulse Resp SpO2 Height Weight   11/28/21 0329 (!) 128/93 97.3  F (36.3  C) Temporal 64 26 98 % 1.727 m (5' 8\") 86.2 kg (190 lb)     GENERAL APPEARANCE: Alert, patient has her head covered, and she is laying in a dark room  FACE: normal facies  EYES: Pupils are equal  HENT: normal external exam  NECK: no adenopathy or asymmetry; supple, no meningismus  RESP: normal respiratory effort; clear breath sounds bilaterally  CV: regular rate and rhythm; no significant murmurs, gallops or rubs  ABD: soft, no tenderness; no rebound or guarding; bowel sounds are normal  EXT: No calf tenderness or pitting edema  SKIN: no worrisome rash  NEURO: no facial droop; no focal deficits, speech is normal        Available Lab/Imaging Results   No results found for this or any previous visit (from the past 24 hour(s)).           Impression     Final diagnoses:   Acute intractable headache         ED Course & Medical Decision Making   Carina George is a 45 year old female who presented to the emergency department with acute intractable headache since 8 PM Saturday night.  Pain was behind the right eye, with severe intensity of 10 out of 10, and accompanying nausea " and vomiting.  Vital signs were reassuring.  Patient has a normal neurologic exam except for the fact that she has severe light sensitivity and photophobia.  Patient received occasions from our intractable headache protocol.    Medications   ketorolac (TORADOL) injection 15 mg (15 mg Intravenous Given 11/28/21 0406)   metoclopramide (REGLAN) injection 10 mg (10 mg Intravenous Given 11/28/21 0409)   diphenhydrAMINE (BENADRYL) injection 25 mg (25 mg Intravenous Given 11/28/21 0407)   magnesium sulfate 1 gm in 100mL D5W intermittent infusion (0 g Intravenous Stopped 11/28/21 0447)   0.9% sodium chloride BOLUS (0 mLs Intravenous Stopped 11/28/21 0510)      5:29 AM: Patient is feeling much improved with a pain level of 1-2/10.  She is getting up to go to the bathroom now.  Patient is stable for discharge home with marked improvement.  Continue with current headache regimen of Imitrex, from at the scene, and magnesium.           Written after-visit summary and instructions were given at the time of discharge.    Follow up Plan:   [facility] Emergency Dept  [address] [place] [address] [phone]    If symptoms worsen      Discharge Instructions:   We are glad that you are feeling better after the headache medications.  Go home and sleep in a cool, dark room.  Resume your Imitrex and promethazine.  Contact your primary clinic provider to get a refill.  Return to the emergency department if your symptoms worsen.       Disclaimer: This note consists of words and symbols derived from keyboarding and dictation using voice recognition software.  As a result, there may be errors that have gone undetected.  Please consider this when interpreting information found in this note.       Alexandra Man MD  11/28/21 6444

## 2021-11-28 NOTE — ED TRIAGE NOTES
Pt states a history of migraine headaches. States she ran out of her migraine medications. States she hasn't had a HA this bad for a year. + light sensitive and nausea.

## 2021-11-29 ENCOUNTER — PATIENT OUTREACH (OUTPATIENT)
Dept: FAMILY MEDICINE | Facility: CLINIC | Age: 46
End: 2021-11-29
Payer: COMMERCIAL

## 2021-11-30 NOTE — TELEPHONE ENCOUNTER
"  ED for acute condition Discharge Protocol    \"Hi, my name is Millicent Vazquez RN, a registered nurse, and I am calling from [facility].  I am calling to follow up and see how things are going for you after your recent emergency visit.\"    Tell me how you are doing now that you are home?\" feeling much       Discharge Instructions    \"Let's review your discharge instructions.  What is/are the follow-up recommendations?  Pt. Response: no    \"Has an appointment with your primary care provider been scheduled?\"  No (not needed)    Medications    \"Tell me what changed about your medicines when you discharged?\"    none    \"What questions do you have about your medications?\"   None        Call Summary    \"What questions or concerns do you have about your recent visit and your follow-up care?\"     none    \"If you have questions or things don't continue to improve, we encourage you contact us through the main clinic number (give number).  Even if the clinic is not open, triage nurses are available 24/7 to help you.     We would like you to know that our clinic has extended hours (provide information).  We also have urgent care (provide details on closest location and hours/contact info)\"    \"Thank you for your time and take care!\"            Millicent ANDRADE RN      "

## 2021-12-29 DIAGNOSIS — Z11.59 ENCOUNTER FOR SCREENING FOR OTHER VIRAL DISEASES: ICD-10-CM

## 2022-01-25 RX ORDER — GABAPENTIN 300 MG/1
300 CAPSULE ORAL AT BEDTIME
COMMUNITY

## 2022-01-27 ENCOUNTER — ANESTHESIA EVENT (OUTPATIENT)
Dept: SURGERY | Facility: CLINIC | Age: 47
End: 2022-01-27
Payer: COMMERCIAL

## 2022-01-28 ENCOUNTER — HOSPITAL ENCOUNTER (OUTPATIENT)
Facility: CLINIC | Age: 47
Discharge: HOME | End: 2022-01-29
Attending: ORTHOPAEDIC SURGERY | Admitting: ORTHOPAEDIC SURGERY
Payer: COMMERCIAL

## 2022-01-28 ENCOUNTER — APPOINTMENT (OUTPATIENT)
Dept: RADIOLOGY | Facility: CLINIC | Age: 47
End: 2022-01-28
Attending: ORTHOPAEDIC SURGERY
Payer: COMMERCIAL

## 2022-01-28 ENCOUNTER — ANCILLARY PROCEDURE (OUTPATIENT)
Dept: ULTRASOUND IMAGING | Facility: CLINIC | Age: 47
End: 2022-01-28
Attending: ANESTHESIOLOGY
Payer: COMMERCIAL

## 2022-01-28 ENCOUNTER — ANESTHESIA (OUTPATIENT)
Dept: SURGERY | Facility: CLINIC | Age: 47
End: 2022-01-28
Payer: COMMERCIAL

## 2022-01-28 DIAGNOSIS — Z96.661 STATUS POST RIGHT ANKLE JOINT REPLACEMENT: Primary | ICD-10-CM

## 2022-01-28 DIAGNOSIS — Z96.661 HISTORY OF ARTHROPLASTY OF RIGHT ANKLE: ICD-10-CM

## 2022-01-28 PROCEDURE — 999N000179 XR SURGERY CARM FLUORO LESS THAN 5 MIN W STILLS

## 2022-01-28 PROCEDURE — 258N000003 HC RX IP 258 OP 636: Performed by: ANESTHESIOLOGY

## 2022-01-28 PROCEDURE — 250N000025 HC SEVOFLURANE, PER MIN: Performed by: ORTHOPAEDIC SURGERY

## 2022-01-28 PROCEDURE — 360N000084 HC SURGERY LEVEL 4 W/ FLUORO, PER MIN: Performed by: ORTHOPAEDIC SURGERY

## 2022-01-28 PROCEDURE — 272N000001 HC OR GENERAL SUPPLY STERILE: Performed by: ORTHOPAEDIC SURGERY

## 2022-01-28 PROCEDURE — 250N000011 HC RX IP 250 OP 636: Performed by: PHYSICIAN ASSISTANT

## 2022-01-28 PROCEDURE — 250N000011 HC RX IP 250 OP 636: Performed by: ORTHOPAEDIC SURGERY

## 2022-01-28 PROCEDURE — 250N000011 HC RX IP 250 OP 636: Performed by: ANESTHESIOLOGY

## 2022-01-28 PROCEDURE — 250N000013 HC RX MED GY IP 250 OP 250 PS 637: Performed by: PHYSICIAN ASSISTANT

## 2022-01-28 PROCEDURE — C1776 JOINT DEVICE (IMPLANTABLE): HCPCS | Performed by: ORTHOPAEDIC SURGERY

## 2022-01-28 PROCEDURE — 999N000141 HC STATISTIC PRE-PROCEDURE NURSING ASSESSMENT: Performed by: ORTHOPAEDIC SURGERY

## 2022-01-28 PROCEDURE — 250N000009 HC RX 250: Performed by: ANESTHESIOLOGY

## 2022-01-28 PROCEDURE — 710N000010 HC RECOVERY PHASE 1, LEVEL 2, PER MIN: Performed by: ORTHOPAEDIC SURGERY

## 2022-01-28 PROCEDURE — 370N000017 HC ANESTHESIA TECHNICAL FEE, PER MIN: Performed by: ORTHOPAEDIC SURGERY

## 2022-01-28 PROCEDURE — 250N000011 HC RX IP 250 OP 636: Performed by: NURSE ANESTHETIST, CERTIFIED REGISTERED

## 2022-01-28 PROCEDURE — 250N000009 HC RX 250: Performed by: NURSE ANESTHETIST, CERTIFIED REGISTERED

## 2022-01-28 DEVICE — IMPLANTABLE DEVICE
Type: IMPLANTABLE DEVICE | Site: ANKLE | Status: FUNCTIONAL
Brand: VANTAGE

## 2022-01-28 RX ORDER — PROCHLORPERAZINE MALEATE 10 MG
10 TABLET ORAL EVERY 6 HOURS PRN
Status: DISCONTINUED | OUTPATIENT
Start: 2022-01-28 | End: 2022-01-29 | Stop reason: HOSPADM

## 2022-01-28 RX ORDER — ACETAMINOPHEN 325 MG/1
975 TABLET ORAL EVERY 8 HOURS
Status: DISCONTINUED | OUTPATIENT
Start: 2022-01-28 | End: 2022-01-29 | Stop reason: HOSPADM

## 2022-01-28 RX ORDER — AMOXICILLIN 250 MG
1 CAPSULE ORAL 2 TIMES DAILY
Status: DISCONTINUED | OUTPATIENT
Start: 2022-01-28 | End: 2022-01-29 | Stop reason: HOSPADM

## 2022-01-28 RX ORDER — GLYCOPYRROLATE 0.2 MG/ML
INJECTION, SOLUTION INTRAMUSCULAR; INTRAVENOUS PRN
Status: DISCONTINUED | OUTPATIENT
Start: 2022-01-28 | End: 2022-01-28

## 2022-01-28 RX ORDER — AMOXICILLIN 250 MG
1-2 CAPSULE ORAL 2 TIMES DAILY
Qty: 30 TABLET | Refills: 0 | Status: SHIPPED | OUTPATIENT
Start: 2022-01-28

## 2022-01-28 RX ORDER — DEXAMETHASONE SODIUM PHOSPHATE 10 MG/ML
INJECTION, SOLUTION INTRAMUSCULAR; INTRAVENOUS PRN
Status: DISCONTINUED | OUTPATIENT
Start: 2022-01-28 | End: 2022-01-28

## 2022-01-28 RX ORDER — SODIUM CHLORIDE, SODIUM LACTATE, POTASSIUM CHLORIDE, CALCIUM CHLORIDE 600; 310; 30; 20 MG/100ML; MG/100ML; MG/100ML; MG/100ML
INJECTION, SOLUTION INTRAVENOUS CONTINUOUS
Status: DISCONTINUED | OUTPATIENT
Start: 2022-01-28 | End: 2022-01-29 | Stop reason: HOSPADM

## 2022-01-28 RX ORDER — ACETAMINOPHEN 325 MG/1
650 TABLET ORAL EVERY 4 HOURS PRN
Status: DISCONTINUED | OUTPATIENT
Start: 2022-01-31 | End: 2022-01-29 | Stop reason: HOSPADM

## 2022-01-28 RX ORDER — OXYCODONE HYDROCHLORIDE 5 MG/1
5-10 TABLET ORAL
Qty: 20 TABLET | Refills: 0 | Status: SHIPPED | OUTPATIENT
Start: 2022-01-28

## 2022-01-28 RX ORDER — POLYETHYLENE GLYCOL 3350 17 G/17G
17 POWDER, FOR SOLUTION ORAL DAILY
Status: DISCONTINUED | OUTPATIENT
Start: 2022-01-29 | End: 2022-01-29 | Stop reason: HOSPADM

## 2022-01-28 RX ORDER — SODIUM CHLORIDE, SODIUM LACTATE, POTASSIUM CHLORIDE, CALCIUM CHLORIDE 600; 310; 30; 20 MG/100ML; MG/100ML; MG/100ML; MG/100ML
INJECTION, SOLUTION INTRAVENOUS CONTINUOUS
Status: DISCONTINUED | OUTPATIENT
Start: 2022-01-28 | End: 2022-01-28 | Stop reason: HOSPADM

## 2022-01-28 RX ORDER — FENTANYL CITRATE 50 UG/ML
25 INJECTION, SOLUTION INTRAMUSCULAR; INTRAVENOUS
Status: DISCONTINUED | OUTPATIENT
Start: 2022-01-28 | End: 2022-01-28 | Stop reason: HOSPADM

## 2022-01-28 RX ORDER — HYDROMORPHONE HCL IN WATER/PF 6 MG/30 ML
0.2 PATIENT CONTROLLED ANALGESIA SYRINGE INTRAVENOUS EVERY 5 MIN PRN
Status: DISCONTINUED | OUTPATIENT
Start: 2022-01-28 | End: 2022-01-28 | Stop reason: HOSPADM

## 2022-01-28 RX ORDER — CEFAZOLIN SODIUM 2 G/100ML
2 INJECTION, SOLUTION INTRAVENOUS
Status: DISCONTINUED | OUTPATIENT
Start: 2022-01-28 | End: 2022-01-28 | Stop reason: HOSPADM

## 2022-01-28 RX ORDER — NALOXONE HYDROCHLORIDE 0.4 MG/ML
0.4 INJECTION, SOLUTION INTRAMUSCULAR; INTRAVENOUS; SUBCUTANEOUS
Status: DISCONTINUED | OUTPATIENT
Start: 2022-01-28 | End: 2022-01-29 | Stop reason: HOSPADM

## 2022-01-28 RX ORDER — GLUCOSAMINE/D3/BOSWELLIA SERRA 1500MG-400
10000 TABLET ORAL AT BEDTIME
COMMUNITY

## 2022-01-28 RX ORDER — HYDROXYZINE HYDROCHLORIDE 25 MG/1
25 TABLET, FILM COATED ORAL EVERY 6 HOURS PRN
Qty: 30 TABLET | Refills: 0 | Status: SHIPPED | OUTPATIENT
Start: 2022-01-28 | End: 2022-01-29

## 2022-01-28 RX ORDER — CEFAZOLIN SODIUM 2 G/100ML
2 INJECTION, SOLUTION INTRAVENOUS SEE ADMIN INSTRUCTIONS
Status: DISCONTINUED | OUTPATIENT
Start: 2022-01-28 | End: 2022-01-28 | Stop reason: HOSPADM

## 2022-01-28 RX ORDER — BUPIVACAINE HYDROCHLORIDE AND EPINEPHRINE 5; 5 MG/ML; UG/ML
INJECTION, SOLUTION PERINEURAL
Status: COMPLETED | OUTPATIENT
Start: 2022-01-28 | End: 2022-01-28

## 2022-01-28 RX ORDER — OXYCODONE HYDROCHLORIDE 5 MG/1
5 TABLET ORAL EVERY 4 HOURS PRN
Status: DISCONTINUED | OUTPATIENT
Start: 2022-01-28 | End: 2022-01-29 | Stop reason: HOSPADM

## 2022-01-28 RX ORDER — CLONAZEPAM 1 MG/1
1 TABLET ORAL DAILY PRN
COMMUNITY

## 2022-01-28 RX ORDER — ONDANSETRON 4 MG/1
4 TABLET, ORALLY DISINTEGRATING ORAL EVERY 30 MIN PRN
Status: DISCONTINUED | OUTPATIENT
Start: 2022-01-28 | End: 2022-01-28 | Stop reason: HOSPADM

## 2022-01-28 RX ORDER — BISACODYL 10 MG
10 SUPPOSITORY, RECTAL RECTAL DAILY PRN
Status: DISCONTINUED | OUTPATIENT
Start: 2022-01-28 | End: 2022-01-29 | Stop reason: HOSPADM

## 2022-01-28 RX ORDER — LIDOCAINE 40 MG/G
CREAM TOPICAL
Status: DISCONTINUED | OUTPATIENT
Start: 2022-01-28 | End: 2022-01-28 | Stop reason: HOSPADM

## 2022-01-28 RX ORDER — NALOXONE HYDROCHLORIDE 0.4 MG/ML
0.2 INJECTION, SOLUTION INTRAMUSCULAR; INTRAVENOUS; SUBCUTANEOUS
Status: DISCONTINUED | OUTPATIENT
Start: 2022-01-28 | End: 2022-01-29 | Stop reason: HOSPADM

## 2022-01-28 RX ORDER — HYDROMORPHONE HCL IN WATER/PF 6 MG/30 ML
0.2 PATIENT CONTROLLED ANALGESIA SYRINGE INTRAVENOUS
Status: DISCONTINUED | OUTPATIENT
Start: 2022-01-28 | End: 2022-01-29 | Stop reason: HOSPADM

## 2022-01-28 RX ORDER — HALOPERIDOL 5 MG/ML
1 INJECTION INTRAMUSCULAR
Status: DISCONTINUED | OUTPATIENT
Start: 2022-01-28 | End: 2022-01-28 | Stop reason: HOSPADM

## 2022-01-28 RX ORDER — CEFAZOLIN SODIUM 2 G/100ML
2 INJECTION, SOLUTION INTRAVENOUS EVERY 8 HOURS
Status: COMPLETED | OUTPATIENT
Start: 2022-01-28 | End: 2022-01-28

## 2022-01-28 RX ORDER — MAGNESIUM SULFATE 4 G/50ML
4 INJECTION INTRAVENOUS ONCE
Status: COMPLETED | OUTPATIENT
Start: 2022-01-28 | End: 2022-01-28

## 2022-01-28 RX ORDER — HYDROMORPHONE HCL IN WATER/PF 6 MG/30 ML
0.4 PATIENT CONTROLLED ANALGESIA SYRINGE INTRAVENOUS
Status: DISCONTINUED | OUTPATIENT
Start: 2022-01-28 | End: 2022-01-29 | Stop reason: HOSPADM

## 2022-01-28 RX ORDER — ONDANSETRON 2 MG/ML
INJECTION INTRAMUSCULAR; INTRAVENOUS PRN
Status: DISCONTINUED | OUTPATIENT
Start: 2022-01-28 | End: 2022-01-28

## 2022-01-28 RX ORDER — VANCOMYCIN HYDROCHLORIDE 1 G/20ML
INJECTION, POWDER, LYOPHILIZED, FOR SOLUTION INTRAVENOUS
Status: DISCONTINUED
Start: 2022-01-28 | End: 2022-01-28 | Stop reason: HOSPADM

## 2022-01-28 RX ORDER — PROPOFOL 10 MG/ML
INJECTION, EMULSION INTRAVENOUS CONTINUOUS PRN
Status: DISCONTINUED | OUTPATIENT
Start: 2022-01-28 | End: 2022-01-28

## 2022-01-28 RX ORDER — LIDOCAINE 40 MG/G
CREAM TOPICAL
Status: DISCONTINUED | OUTPATIENT
Start: 2022-01-28 | End: 2022-01-29 | Stop reason: HOSPADM

## 2022-01-28 RX ORDER — MEPERIDINE HYDROCHLORIDE 25 MG/ML
12.5 INJECTION INTRAMUSCULAR; INTRAVENOUS; SUBCUTANEOUS
Status: DISCONTINUED | OUTPATIENT
Start: 2022-01-28 | End: 2022-01-28 | Stop reason: HOSPADM

## 2022-01-28 RX ORDER — VANCOMYCIN HYDROCHLORIDE 1 G/20ML
1 INJECTION, POWDER, LYOPHILIZED, FOR SOLUTION INTRAVENOUS ONCE
Status: COMPLETED | OUTPATIENT
Start: 2022-01-28 | End: 2022-01-28

## 2022-01-28 RX ORDER — PROPOFOL 10 MG/ML
INJECTION, EMULSION INTRAVENOUS PRN
Status: DISCONTINUED | OUTPATIENT
Start: 2022-01-28 | End: 2022-01-28

## 2022-01-28 RX ORDER — HYDROXYZINE HYDROCHLORIDE 25 MG/1
25 TABLET, FILM COATED ORAL EVERY 6 HOURS PRN
Status: DISCONTINUED | OUTPATIENT
Start: 2022-01-28 | End: 2022-01-29 | Stop reason: HOSPADM

## 2022-01-28 RX ORDER — ACETAMINOPHEN 325 MG/1
650 TABLET ORAL EVERY 4 HOURS PRN
Qty: 100 TABLET | Refills: 0 | Status: SHIPPED | OUTPATIENT
Start: 2022-01-28

## 2022-01-28 RX ORDER — FERROUS SULFATE 325(65) MG
325 TABLET ORAL AT BEDTIME
COMMUNITY

## 2022-01-28 RX ORDER — LIDOCAINE HYDROCHLORIDE 10 MG/ML
INJECTION, SOLUTION INFILTRATION; PERINEURAL PRN
Status: DISCONTINUED | OUTPATIENT
Start: 2022-01-28 | End: 2022-01-28

## 2022-01-28 RX ORDER — ONDANSETRON 2 MG/ML
4 INJECTION INTRAMUSCULAR; INTRAVENOUS EVERY 30 MIN PRN
Status: DISCONTINUED | OUTPATIENT
Start: 2022-01-28 | End: 2022-01-28 | Stop reason: HOSPADM

## 2022-01-28 RX ORDER — CHOLECALCIFEROL (VITAMIN D3) 50 MCG
1 TABLET ORAL AT BEDTIME
COMMUNITY

## 2022-01-28 RX ORDER — ONDANSETRON 2 MG/ML
4 INJECTION INTRAMUSCULAR; INTRAVENOUS EVERY 6 HOURS PRN
Status: DISCONTINUED | OUTPATIENT
Start: 2022-01-28 | End: 2022-01-29 | Stop reason: HOSPADM

## 2022-01-28 RX ORDER — CEFAZOLIN SODIUM 2 G/100ML
2 INJECTION, SOLUTION INTRAVENOUS EVERY 8 HOURS
Status: DISCONTINUED | OUTPATIENT
Start: 2022-01-28 | End: 2022-01-28

## 2022-01-28 RX ORDER — SUMATRIPTAN 50 MG/1
100 TABLET, FILM COATED ORAL ONCE
Status: COMPLETED | OUTPATIENT
Start: 2022-01-28 | End: 2022-01-28

## 2022-01-28 RX ORDER — FENTANYL CITRATE 50 UG/ML
INJECTION, SOLUTION INTRAMUSCULAR; INTRAVENOUS PRN
Status: DISCONTINUED | OUTPATIENT
Start: 2022-01-28 | End: 2022-01-28

## 2022-01-28 RX ORDER — OXYCODONE HYDROCHLORIDE 5 MG/1
5 TABLET ORAL EVERY 4 HOURS PRN
Status: DISCONTINUED | OUTPATIENT
Start: 2022-01-28 | End: 2022-01-28 | Stop reason: HOSPADM

## 2022-01-28 RX ORDER — ACETAMINOPHEN 325 MG/1
975 TABLET ORAL ONCE
Status: COMPLETED | OUTPATIENT
Start: 2022-01-28 | End: 2022-01-28

## 2022-01-28 RX ORDER — ONDANSETRON 4 MG/1
4 TABLET, ORALLY DISINTEGRATING ORAL EVERY 6 HOURS PRN
Status: DISCONTINUED | OUTPATIENT
Start: 2022-01-28 | End: 2022-01-29 | Stop reason: HOSPADM

## 2022-01-28 RX ORDER — KETAMINE HYDROCHLORIDE 50 MG/ML
INJECTION, SOLUTION INTRAMUSCULAR; INTRAVENOUS PRN
Status: DISCONTINUED | OUTPATIENT
Start: 2022-01-28 | End: 2022-01-28

## 2022-01-28 RX ORDER — OXYCODONE HYDROCHLORIDE 5 MG/1
10 TABLET ORAL EVERY 4 HOURS PRN
Status: DISCONTINUED | OUTPATIENT
Start: 2022-01-28 | End: 2022-01-29 | Stop reason: HOSPADM

## 2022-01-28 RX ORDER — FENTANYL CITRATE 50 UG/ML
50 INJECTION, SOLUTION INTRAMUSCULAR; INTRAVENOUS
Status: DISCONTINUED | OUTPATIENT
Start: 2022-01-28 | End: 2022-01-28 | Stop reason: HOSPADM

## 2022-01-28 RX ORDER — FENTANYL CITRATE 50 UG/ML
25 INJECTION, SOLUTION INTRAMUSCULAR; INTRAVENOUS EVERY 5 MIN PRN
Status: DISCONTINUED | OUTPATIENT
Start: 2022-01-28 | End: 2022-01-28 | Stop reason: HOSPADM

## 2022-01-28 RX ADMIN — FENTANYL CITRATE 50 MCG: 50 INJECTION, SOLUTION INTRAMUSCULAR; INTRAVENOUS at 07:38

## 2022-01-28 RX ADMIN — HYDROMORPHONE HYDROCHLORIDE 1 MG: 1 INJECTION, SOLUTION INTRAMUSCULAR; INTRAVENOUS; SUBCUTANEOUS at 08:20

## 2022-01-28 RX ADMIN — LIDOCAINE HYDROCHLORIDE 5 ML: 10 INJECTION, SOLUTION INFILTRATION; PERINEURAL at 07:38

## 2022-01-28 RX ADMIN — FENTANYL CITRATE 50 MCG: 50 INJECTION, SOLUTION INTRAMUSCULAR; INTRAVENOUS at 07:53

## 2022-01-28 RX ADMIN — PROPOFOL 200 MG: 10 INJECTION, EMULSION INTRAVENOUS at 07:38

## 2022-01-28 RX ADMIN — CEFAZOLIN SODIUM 2 G: 2 INJECTION, SOLUTION INTRAVENOUS at 07:28

## 2022-01-28 RX ADMIN — SENNOSIDES AND DOCUSATE SODIUM 1 TABLET: 50; 8.6 TABLET ORAL at 21:08

## 2022-01-28 RX ADMIN — GLYCOPYRROLATE 0.2 MG: 0.2 INJECTION, SOLUTION INTRAMUSCULAR; INTRAVENOUS at 07:47

## 2022-01-28 RX ADMIN — KETAMINE HYDROCHLORIDE 50 MG: 50 INJECTION, SOLUTION INTRAMUSCULAR; INTRAVENOUS at 07:53

## 2022-01-28 RX ADMIN — MAGNESIUM SULFATE HEPTAHYDRATE 4 G: 80 INJECTION, SOLUTION INTRAVENOUS at 06:49

## 2022-01-28 RX ADMIN — BUPIVACAINE HYDROCHLORIDE AND EPINEPHRINE BITARTRATE 30 ML: 5; .005 INJECTION, SOLUTION PERINEURAL at 07:10

## 2022-01-28 RX ADMIN — SODIUM CHLORIDE, POTASSIUM CHLORIDE, SODIUM LACTATE AND CALCIUM CHLORIDE: 600; 310; 30; 20 INJECTION, SOLUTION INTRAVENOUS at 06:32

## 2022-01-28 RX ADMIN — PROPOFOL 200 MCG/KG/MIN: 10 INJECTION, EMULSION INTRAVENOUS at 07:38

## 2022-01-28 RX ADMIN — CEFAZOLIN SODIUM 2 G: 2 INJECTION, SOLUTION INTRAVENOUS at 23:24

## 2022-01-28 RX ADMIN — MIDAZOLAM 1 MG: 1 INJECTION INTRAMUSCULAR; INTRAVENOUS at 07:42

## 2022-01-28 RX ADMIN — SUMATRIPTAN SUCCINATE 100 MG: 50 TABLET ORAL at 16:10

## 2022-01-28 RX ADMIN — ASPIRIN 325 MG: 325 TABLET ORAL at 14:54

## 2022-01-28 RX ADMIN — ACETAMINOPHEN 975 MG: 325 TABLET, FILM COATED ORAL at 13:22

## 2022-01-28 RX ADMIN — CEFAZOLIN SODIUM 2 G: 2 INJECTION, SOLUTION INTRAVENOUS at 16:10

## 2022-01-28 RX ADMIN — FENTANYL CITRATE 100 MCG: 50 INJECTION, SOLUTION INTRAMUSCULAR; INTRAVENOUS at 07:08

## 2022-01-28 RX ADMIN — ONDANSETRON 4 MG: 2 INJECTION INTRAMUSCULAR; INTRAVENOUS at 07:47

## 2022-01-28 RX ADMIN — MIDAZOLAM HYDROCHLORIDE 2 MG: 1 INJECTION, SOLUTION INTRAMUSCULAR; INTRAVENOUS at 07:10

## 2022-01-28 RX ADMIN — ONDANSETRON 4 MG: 2 INJECTION INTRAMUSCULAR; INTRAVENOUS at 14:53

## 2022-01-28 RX ADMIN — OXYCODONE HYDROCHLORIDE 5 MG: 5 TABLET ORAL at 14:54

## 2022-01-28 RX ADMIN — PROPOFOL 40 MG: 10 INJECTION, EMULSION INTRAVENOUS at 08:21

## 2022-01-28 RX ADMIN — DEXAMETHASONE SODIUM PHOSPHATE 10 MG: 10 INJECTION, SOLUTION INTRAMUSCULAR; INTRAVENOUS at 07:38

## 2022-01-28 RX ADMIN — ACETAMINOPHEN 975 MG: 325 TABLET ORAL at 06:43

## 2022-01-28 RX ADMIN — PROPOFOL 50 MG: 10 INJECTION, EMULSION INTRAVENOUS at 07:55

## 2022-01-28 RX ADMIN — HYDROXYZINE HYDROCHLORIDE 25 MG: 25 TABLET ORAL at 14:54

## 2022-01-28 RX ADMIN — Medication 100 MG: at 07:38

## 2022-01-28 RX ADMIN — BUPIVACAINE HYDROCHLORIDE AND EPINEPHRINE 15 ML: 5; 5 INJECTION, SOLUTION PERINEURAL at 07:12

## 2022-01-28 RX ADMIN — SODIUM CHLORIDE, POTASSIUM CHLORIDE, SODIUM LACTATE AND CALCIUM CHLORIDE: 600; 310; 30; 20 INJECTION, SOLUTION INTRAVENOUS at 09:55

## 2022-01-28 RX ADMIN — MIDAZOLAM 1 MG: 1 INJECTION INTRAMUSCULAR; INTRAVENOUS at 07:55

## 2022-01-28 ASSESSMENT — LIFESTYLE VARIABLES: TOBACCO_USE: 1

## 2022-01-28 ASSESSMENT — MIFFLIN-ST. JEOR: SCORE: 1578.48

## 2022-01-28 NOTE — ANESTHESIA PROCEDURE NOTES
Adductor canal Procedure Note    Pre-Procedure   Staff -        Anesthesiologist:  Ashish Salvador MD       Performed By: anesthesiologist       Location: pre-op       Procedure Start/Stop Times: 1/28/2022 7:10 AM and 1/28/2022 7:12 AM       Pre-Anesthestic Checklist: patient identified, IV checked, site marked, risks and benefits discussed, informed consent, monitors and equipment checked, pre-op evaluation, at physician/surgeon's request and post-op pain management  Timeout:       Correct Patient: Yes        Correct Procedure: Yes        Correct Site: Yes        Correct Position: Yes        Correct Laterality: Yes        Site Marked: Yes  Procedure Documentation  Procedure: Adductor canal       Laterality: right       Patient Position: supine       Patient Prep/Sterile Barriers: sterile gloves, mask       Skin prep: Chloraprep       Needle Type: insulated       Needle Gauge: 20.        Needle Length (Inches): 4        Ultrasound guided       1. Ultrasound was used to identify targeted nerve, plexus, vascular marker, or fascial plane and place a needle adjacent to it in real-time.       2. Ultrasound was used to visualize the spread of anesthetic in close proximity to the above referenced structure.       3. A permanent image is entered into the patient's record.       4. The visualized anatomic structures appeared normal.       5. There were no apparent abnormal pathologic findings.    Assessment/Narrative         The placement was negative for: blood aspirated, painful injection and site bleeding       Paresthesias: No.     Bolus given via needle..        Secured via.        Insertion/Infusion Method: Single Shot       Complications: none       Injection made incrementally with aspirations every 5 mL.    Medication(s) Administered   Bupivacaine 0.5% w/ 1:200K Epi (Other), 15 mL  Medication Administration Time: 1/28/2022 7:12 AM

## 2022-01-28 NOTE — ANESTHESIA PROCEDURE NOTES
Airway       Patient location during procedure: OR       Procedure Start/Stop Times: 1/28/2022 7:39 AM and 1/28/2022 7:40 AM  Staff -        CRNA: Debbie Vo APRN CRNA       Performed By: CRNA  Consent for Airway        Urgency: elective  Indications and Patient Condition       Indications for airway management: rangel-procedural         Mask difficulty assessment: 0 - not attempted    Final Airway Details       Final airway type: endotracheal airway       Successful airway: ETT - single  Endotracheal Airway Details        ETT size (mm): 7.0       Cuffed: yes       Cuff volume (mL): 8       Successful intubation technique: direct laryngoscopy       DL Blade Type: Fragoso 2       Grade View of Cords: 1       Adjucts: stylet and tooth guard       Position: Right       Measured from: lips       Secured at (cm): 21       Bite block used: Molar    Post intubation assessment        Placement verified by: capnometry, equal breath sounds and chest rise        Number of attempts at approach: 1       Secured with: silk tape       Ease of procedure: easy       Dentition: Intact and Unchanged

## 2022-01-28 NOTE — ANESTHESIA POSTPROCEDURE EVALUATION
Patient: Carina George    Procedure: Procedure(s):  RIGHT TOTAL ANKLE ARTHROPLASTY       Diagnosis:Arthritis of right ankle [M19.071]  Diagnosis Additional Information: No value filed.    Anesthesia Type:  General    Note:  Disposition: Outpatient   Postop Pain Control: Uneventful            Sign Out: Well controlled pain   PONV: No   Neuro/Psych: Uneventful            Sign Out: Acceptable/Baseline neuro status   Airway/Respiratory: Uneventful            Sign Out: Acceptable/Baseline resp. status   CV/Hemodynamics: Uneventful            Sign Out: Acceptable CV status; No obvious hypovolemia; No obvious fluid overload   Other NRE: NONE   DID A NON-ROUTINE EVENT OCCUR? No           Last vitals:  Vitals Value Taken Time   /64 01/28/22 1031   Temp 36.6  C (97.8  F) 01/28/22 1000   Pulse 60 01/28/22 1036   Resp 15 01/28/22 1007   SpO2 96 % 01/28/22 1036   Vitals shown include unvalidated device data.    Electronically Signed By: BELL LINCOLN MD  January 28, 2022  10:38 AM

## 2022-01-28 NOTE — ANESTHESIA PREPROCEDURE EVALUATION
Anesthesia Pre-Procedure Evaluation    Patient: Carina George   MRN: [id number] : 1975        Preoperative Diagnosis: Arthritis of right ankle [M19.071]    Procedure : Procedure(s):  RIGHT TOTAL ANKLE ARTHROPLASTY          Past Medical History:   Diagnosis Date     Depressive disorder, not elsewhere classified age 19    Attempted suicide, hx Zoloft and Lexapro     Dysplasia of cervix, unspecified     JOSE CARLOS 3     Female stress incontinence      Migraines      Obesity, unspecified      Thyroid disease      Urinary tract infection, site not specified     Recurrent UTI's      Past Surgical History:   Procedure Laterality Date     D & C      Missed AB     HYSTERECTOMY, VAGINAL  2004    TVH, TVT procedure     [facility] EYE SERVICE OR PROCEDURE OPNP      eye surgery     [facility] OPEN RX ANKLE DISLOCATN+FIXATN  2002    Right      No Known Allergies   Social History     Tobacco Use     Smoking status: Current Every Day Smoker     Packs/day: 1.00     Years: 15.00     Pack years: 15.00     Types: Cigarettes     Smokeless tobacco: Never Used     Tobacco comment: patient is down to a few ciggarrettes per day as of 2009, she is taking chantix   Substance Use Topics     Alcohol use: Yes     Comment: 6 drinks per month      Wt Readings from Last 1 Encounters:   22 88 kg (194 lb)        Anesthesia Evaluation   Pt has had prior anesthetic.     History of anesthetic complications  - PONV.      ROS/MED HX  ENT/Pulmonary:     (+) tobacco use, Current use,     Neurologic:     (+) migraines,     Cardiovascular:  - neg cardiovascular ROS     METS/Exercise Tolerance:     Hematologic:  - neg hematologic  ROS     Musculoskeletal:  - neg musculoskeletal ROS     GI/Hepatic:     (+) GERD, Symptomatic,     Renal/Genitourinary:  - neg Renal ROS     Endo:     (+) thyroid problem, hypothyroidism, Obesity,     Psychiatric/Substance Use:     (+) psychiatric history depression     Infectious Disease:  - neg infectious disease  ROS     Malignancy:  - neg malignancy ROS     Other:  - neg other ROS          Physical Exam    Airway  airway exam normal      Mallampati: II   TM distance: > 3 FB   Neck ROM: full   Mouth opening: > 3 cm    Respiratory Devices and Support         Dental  no notable dental history       B=Bridge, C=Chipped, L=Loose, M=Missing    Cardiovascular   cardiovascular exam normal       Rhythm and rate: regular and normal     Pulmonary   pulmonary exam normal        breath sounds clear to auscultation           OUTSIDE LABS:  CBC:   Lab Results   Component Value Date    WBC 3.9 (L) 11/06/2020    WBC 8.3 01/26/2009    HGB 15.3 11/06/2020    HGB 15.6 01/26/2009    HCT 44.9 11/06/2020    HCT 44.1 01/26/2009     11/06/2020     01/26/2009     BMP:   Lab Results   Component Value Date     11/06/2020     01/26/2009    POTASSIUM 3.8 11/06/2020    POTASSIUM 4.3 01/26/2009    CHLORIDE 111 (H) 11/06/2020    CHLORIDE 107 01/26/2009    CO2 26 11/06/2020    CO2 29 01/26/2009    BUN 8 11/06/2020    BUN 11 01/26/2009    CR 0.89 11/06/2020    CR 0.97 01/26/2009    GLC 97 11/06/2020    GLC 95 01/26/2009     COAGS: No results found for: PTT, INR, FIBR  POC:   Lab Results   Component Value Date    HCG  10/17/2009     Negative   This test provides a presumptive diagnosis of pregnancy or non-pregnancy. A   confirmed pregnancy diagnosis should only be made by a physician after all   clinical and laboratory findings have been evaluated.     HEPATIC:   Lab Results   Component Value Date    ALBUMIN 3.7 11/06/2020    PROTTOTAL 6.8 11/06/2020    ALT 33 11/06/2020    AST 24 11/06/2020    ALKPHOS 95 11/06/2020    BILITOTAL 0.2 11/06/2020     OTHER:   Lab Results   Component Value Date    PH 6.5 05/17/2002    GLORIA 8.7 11/06/2020    TSH 1.08 02/01/2021    T4 1.02 11/07/2005    SED 42 (H) 05/07/2008       Anesthesia Plan    ASA Status:  2   NPO Status:  NPO Appropriate    Anesthesia Type: General.     - Airway: ETT   Induction:  Intravenous, RSI, Propofol.   Maintenance: TIVA.        Consents    Anesthesia Plan(s) and associated risks, benefits, and realistic alternatives discussed. Questions answered and patient/representative(s) expressed understanding.     - Discussed: Risks, Benefits and Alternatives for BOTH SEDATION and the PROCEDURE were discussed     - Discussed with:  Patient         Postoperative Care    Pain management: IV analgesics, Oral pain medications, Multi-modal analgesia.   PONV prophylaxis: Ondansetron (or other 5HT-3), Dexamethasone or Solumedrol, Droperidol or Haldol, Background Propofol Infusion     Comments:    Other Comments: Pre op adductor canal and popliteal block             BELL LINCOLN MD

## 2022-01-28 NOTE — OP NOTE
Procedure Date: 01/28/2022    PREOPERATIVE DIAGNOSIS:  Right posttraumatic ankle arthrosis.    POSTOPERATIVE DIAGNOSIS:  Right posttraumatic ankle arthrosis.    OPERATION:  Right total ankle arthroplasty with Exactech, size 3 tibia, size 3 talus, size 3 poly, the narrowest thickness and then the size 3 anterior poly clip.    SURGEON:  Jose Lanza MD    ASSISTANT:  ERIN Pedroza    DESCRIPTION OF PROCEDURE:  The patient was brought to the operating suite and placed in the supine position.  General anesthetic was given without difficulties.  The right lower extremity was prepped and draped in usual sterile fashion.  Tourniquet to the right thigh was inflated to 250 mmHg after exsanguination of the leg with an Esmarch.  The usual anterior incision was made and brought down in the interval between the anterior tibial tendon and the EHL. The neurovascular bundle was right underneath the anterior tibial tendon.  This was carefully retracted laterally.  There were large osteophytes in place, they were left in place, except for one off the neck of the talus and then the patient specific instrumentation guide was placed on the distal tibia after the soft tissues from the front and the sides of the distal tibia were removed.  The tibial cut was made and then secondary PSI was used to cut the top of the talus.  The bone quality was excellent overall. The anterior and posterior chamfer cuts were made and then the top of the talus was rasped in the usual fashion.  Anterior osteophytes off the neck of the talus were removed as well to allow that process to occur. The tibial tray was trialed with a thin poly size 3 and seemed to fit quite nicely and the tissue seemed to balance quite nicely as well.  There were no signs of any gutter impingement.  The tibia was then punched for the pegs and the pegs for the talar component were then drilled and then the implants were placed after irrigation of the wound with normal  saline.  The implant seemed to fit nicely in the thinnest poly, the size 8 seemed to fit the best and the soft tissue balancing was excellent.  The range of motion was slightly limited with 10 degrees of ankle dorsiflexion with knee extended, but it was felt to be adequate. Since the patient is younger, I did not want to lengthen her Achilles tendon or do a gastroc unless I absolutely had to.  The wound was then irrigated and closed in layered fashion over top of a gram of vancomycin powder.  No drain was placed, just a Cobos dressing with the ankle in slight plantarflexion.  The patient was then awakened and brought to the recovery room in stable condition.    It should be noted that throughout the case, fluoroscopic images were taken to help guide the implantation of the total ankle to make sure the right angle of the cuts were achieved.    ERIN Pedroza, was essential in providing intraoperative positioning of the patient, wound irrigation, retraction, layered closure and splint application.    Jose Lanza MD        D: 2022   T: 2022   MT: celeste    Name:     BEREKET NIXON  MRN:      [id number]        Account:        [id number]   :      1975           Procedure Date: 2022     Document: [id number]

## 2022-01-28 NOTE — ANESTHESIA PROCEDURE NOTES
Airway       Patient location during procedure: OR       Procedure Start/Stop Times: 1/28/2022 7:39 AM and 1/28/2022 7:40 AM  Staff -        CRNA: Debbie Vo APRN CRNA       Performed By: CRNA  Consent for Airway        Urgency: elective  Indications and Patient Condition       Indications for airway management: rangel-procedural       Induction type:intravenous       Mask difficulty assessment: 0 - not attempted    Final Airway Details       Final airway type: endotracheal airway       Successful airway: ETT - single  Endotracheal Airway Details        ETT size (mm): 7.0       Cuffed: yes       Cuff volume (mL): 8       Successful intubation technique: direct laryngoscopy       DL Blade Type: Fragoso 2       Grade View of Cords: 1       Adjucts: stylet and tooth guard       Position: Right       Measured from: lips       Secured at (cm): 21       Bite block used: Molar    Post intubation assessment        Placement verified by: capnometry, equal breath sounds and chest rise        Number of attempts at approach: 1       Secured with: silk tape       Ease of procedure: easy       Dentition: Intact and Unchanged

## 2022-01-28 NOTE — ANESTHESIA PROCEDURE NOTES
Sciatic Procedure Note    Pre-Procedure   Staff -        Anesthesiologist:  Ashish Salvador MD       Performed By: anesthesiologist       Location: pre-op       Procedure Start/Stop Times: 1/28/2022 7:05 AM and 1/28/2022 7:10 AM       Pre-Anesthestic Checklist: patient identified, IV checked, site marked, risks and benefits discussed, informed consent, monitors and equipment checked, pre-op evaluation, at physician/surgeon's request and post-op pain management  Timeout:       Correct Patient: Yes        Correct Procedure: Yes        Correct Site: Yes        Correct Position: Yes        Correct Laterality: Yes        Site Marked: Yes  Procedure Documentation  Procedure: Sciatic       Laterality: right       Patient Position: supine       Patient Prep/Sterile Barriers: sterile gloves, mask       Skin prep: Chloraprep (popliteal approach).       Needle Type: insulated       Needle Gauge: 20.        Needle Length (Inches): 4        Ultrasound guided       1. Ultrasound was used to identify targeted nerve, plexus, vascular marker, or fascial plane and place a needle adjacent to it in real-time.       2. Ultrasound was used to visualize the spread of anesthetic in close proximity to the above referenced structure.       3. A permanent image is entered into the patient's record.       4. The visualized anatomic structures appeared normal.       5. There were no apparent abnormal pathologic findings.    Assessment/Narrative         The placement was negative for: blood aspirated, painful injection and site bleeding       Paresthesias: No.     Bolus given via needle..        Secured via.        Insertion/Infusion Method: Single Shot       Complications: none       Injection made incrementally with aspirations every 5 mL.    Medication(s) Administered   Bupivacaine 0.5% w/ 1:200K Epi (Other), 30 mL  Medication Administration Time: 1/28/2022 7:10 AM

## 2022-01-28 NOTE — PHARMACY-ADMISSION MEDICATION HISTORY
Pharmacist completed medication history with the patient while in the [facility].  Prior to admission (PTA) med list completed and updated in the electronic medical record (EMR).  Pharmacy Note - Admission Medication History  Pertinent Provider Information: none   ______________________________________________________________________  Prior To Admission (PTA) med list completed and updated in EMR.     Medications Prior to Admission   Medication Sig Dispense Refill Last Dose     Biotin 83995 MCG TABS Take 10,000 mcg by mouth At Bedtime   1/27/2022 at Unknown time     clonazePAM (KLONOPIN) 1 MG tablet Take 1 mg by mouth daily as needed for anxiety or sleep   1/21/2022     Cyanocobalamin (B-12) 1000 MCG TBCR Take 1,000 mcg by mouth At Bedtime    1/27/2022 at Unknown time     ferrous sulfate (FEROSUL) 325 (65 Fe) MG tablet Take 325 mg by mouth At Bedtime   1/27/2022 at Unknown time     fluticasone (FLONASE) 50 MCG/ACT nasal spray INSTILL 2 SPRAYS INTO BOTH NOSTRILS DAILY 48 mL 3 1/27/2022 at Unknown time     gabapentin (NEURONTIN) 300 MG capsule Take 300 mg by mouth At Bedtime    1/27/2022 at Unknown time     levothyroxine (SYNTHROID/LEVOTHROID) 50 MCG tablet Take 1 tablet (50 mcg) by mouth daily Hold on file until needed. 90 tablet 3 1/27/2022 at at HS     MAGNESIUM GLYCINATE PO Take 250 mg by mouth At Bedtime    1/27/2022 at Unknown time     omeprazole (PRILOSEC) 40 MG DR capsule Take 1 capsule (40 mg) by mouth every morning (Patient taking differently: Take 40 mg by mouth At Bedtime ) 90 capsule 3 1/27/2022 at Unknown time     potassium 99 MG TABS Take 1 tablet by mouth At Bedtime   1/27/2022 at Unknown time     promethazine (PHENERGAN) 25 MG tablet Take 25 mg by mouth every 6 hours as needed    1/27/2022 at Unknown time     Rhubarb (ESTROVEN COMPLETE PO) Take 1 tablet by mouth At Bedtime    1/27/2022 at Unknown time     SUMAtriptan (IMITREX) 100 MG tablet Take 1 tablet (100 mg) by mouth at onset of headache for migraine  May repeat once in 2 hours if needed 27 tablet 3 1/27/2022 at Unknown time     vitamin D3 (CHOLECALCIFEROL) 50 mcg (2000 units) tablet Take 1 tablet by mouth At Bedtime   1/27/2022 at Unknown time     Zinc Acetate, Oral, (ZINC ACETATE PO) Take 50 mg by mouth At Bedtime    1/27/2022 at Unknown time         Information source(s): Patient and CareEverywhere/SureScripts  Patient was asked about OTC/herbal products specifically.  PTA med list reflects this.  Based on the pharmacist s assessment, the PTA med list information appears reliable  Allergies were reviewed, assessed, and updated with the patient.    Medications available for use during hospital stay: Flonase   Thank you for the opportunity to participate in the care of this patient.    The patient was asked about OTC/herbal products specifically and the PTA med list reflects the patient's response.    Allergies were reviewed and assessed with the patient, responses were updated in the EMR.    Thank you for the opportunity to participate in the care of this patient.    Jakub Taylor RPH 1/28/2022 7:13 AM

## 2022-01-29 ENCOUNTER — APPOINTMENT (OUTPATIENT)
Dept: PHYSICAL THERAPY | Facility: CLINIC | Age: 47
End: 2022-01-29
Attending: PHYSICIAN ASSISTANT
Payer: COMMERCIAL

## 2022-01-29 VITALS
WEIGHT: 194 LBS | DIASTOLIC BLOOD PRESSURE: 58 MMHG | SYSTOLIC BLOOD PRESSURE: 111 MMHG | HEART RATE: 65 BPM | OXYGEN SATURATION: 96 % | RESPIRATION RATE: 16 BRPM | BODY MASS INDEX: 29.4 KG/M2 | TEMPERATURE: 97.4 F | HEIGHT: 68 IN

## 2022-01-29 LAB
FASTING STATUS PATIENT QL REPORTED: NO
GLUCOSE BLD-MCNC: 141 MG/DL (ref 70–125)

## 2022-01-29 PROCEDURE — 250N000013 HC RX MED GY IP 250 OP 250 PS 637: Performed by: PHYSICIAN ASSISTANT

## 2022-01-29 PROCEDURE — 250N000011 HC RX IP 250 OP 636: Performed by: PHYSICIAN ASSISTANT

## 2022-01-29 PROCEDURE — 82947 ASSAY GLUCOSE BLOOD QUANT: CPT | Performed by: HOSPITALIST

## 2022-01-29 PROCEDURE — 97162 PT EVAL MOD COMPLEX 30 MIN: CPT | Mod: GP

## 2022-01-29 PROCEDURE — 97116 GAIT TRAINING THERAPY: CPT | Mod: GP

## 2022-01-29 PROCEDURE — 36415 COLL VENOUS BLD VENIPUNCTURE: CPT | Performed by: HOSPITALIST

## 2022-01-29 PROCEDURE — 97110 THERAPEUTIC EXERCISES: CPT | Mod: GP

## 2022-01-29 RX ORDER — HYDROXYZINE HYDROCHLORIDE 25 MG/1
25 TABLET, FILM COATED ORAL EVERY 6 HOURS PRN
Qty: 30 TABLET | Refills: 0 | Status: SHIPPED | OUTPATIENT
Start: 2022-01-29

## 2022-01-29 RX ORDER — ACETAMINOPHEN 325 MG/1
650 TABLET ORAL EVERY 4 HOURS PRN
Qty: 100 TABLET | Refills: 0 | Status: SHIPPED | OUTPATIENT
Start: 2022-01-29 | End: 2022-01-29

## 2022-01-29 RX ORDER — HYDROXYZINE HYDROCHLORIDE 25 MG/1
25 TABLET, FILM COATED ORAL EVERY 6 HOURS PRN
Qty: 30 TABLET | Refills: 0 | Status: SHIPPED | OUTPATIENT
Start: 2022-01-29 | End: 2022-01-29

## 2022-01-29 RX ADMIN — POLYETHYLENE GLYCOL 3350 17 G: 17 POWDER, FOR SOLUTION ORAL at 08:29

## 2022-01-29 RX ADMIN — ASPIRIN 325 MG: 325 TABLET ORAL at 08:26

## 2022-01-29 RX ADMIN — ONDANSETRON 4 MG: 2 INJECTION INTRAMUSCULAR; INTRAVENOUS at 08:33

## 2022-01-29 RX ADMIN — ACETAMINOPHEN 975 MG: 325 TABLET, FILM COATED ORAL at 11:02

## 2022-01-29 RX ADMIN — HYDROXYZINE HYDROCHLORIDE 25 MG: 25 TABLET ORAL at 08:26

## 2022-01-29 RX ADMIN — SENNOSIDES AND DOCUSATE SODIUM 1 TABLET: 50; 8.6 TABLET ORAL at 08:28

## 2022-01-29 RX ADMIN — OXYCODONE HYDROCHLORIDE 10 MG: 5 TABLET ORAL at 08:26

## 2022-01-29 RX ADMIN — ACETAMINOPHEN 975 MG: 325 TABLET, FILM COATED ORAL at 03:39

## 2022-01-29 NOTE — DISCHARGE SUMMARY
[facility] Discharge Summary                                       BEREKET NIXON [id number]   Age: 46 year old  PCP: Hernandez Anderson, [phone] 1975     Date of Admission:  1/28/2022  Date of Discharge::  1/29/2022  Discharge Physician:  Shalonda Rand PA-C    Code status:  Full Code    Admission Information:  Admission Diagnosis:  Arthritis of right ankle [M19.071]  History of arthroplasty of right ankle [Z98.890]    Post-Operative Day: 1 Day Post-Op     Reason for admission:  The patient was admitted for the following:Procedure(s) (LRB):  RIGHT TOTAL ANKLE ARTHROPLASTY (Right)    Active Problems:    History of arthroplasty of right ankle      Allergies:  Patient has no known allergies.    Following the procedure noted above the patient was transferred to the post-op floor and started on:    Therapy:  physical therapy and occupational therapy  Anticoagulation Plan: ASA 81 mg Qday  Pain Management: scopainmedication: oxycodone  Weight bearing status: Non weight bearing, splint in place     The patient was followed and co-managed by the hospitalist service during the inpatient treatment course  Complications:  None  Consultations:  None     Pertinent Labs   Lab Results: personally reviewed.     Recent Labs   Lab Test 11/06/20  1659   HGB 15.3   HCT 44.9   MCV 97                Discharge Information:  Condition at discharge: Stable  Discharge destination:  Discharged to home     Medications at discharge:  Current Discharge Medication List      START taking these medications    Details   !! acetaminophen (TYLENOL) 325 MG tablet Take 2 tablets (650 mg) by mouth every 4 hours as needed for other (mild pain)  Qty: 100 tablet, Refills: 0    Associated Diagnoses: History of arthroplasty of right ankle      !! acetaminophen (TYLENOL) 325 MG tablet Take 2 tablets (650 mg) by mouth every 4 hours as needed for other (mild pain)  Qty: 100 tablet, Refills: 0    Associated  Diagnoses: Status post right ankle joint replacement      aspirin (ASA) 325 MG EC tablet Take 1 tablet (325 mg) by mouth daily  Qty: 30 tablet, Refills: 0    Associated Diagnoses: Status post right ankle joint replacement      !! hydrOXYzine (ATARAX) 25 MG tablet Take 1 tablet (25 mg) by mouth every 6 hours as needed for itching or anxiety (with pain, moderate pain)  Qty: 30 tablet, Refills: 0    Associated Diagnoses: Status post right ankle joint replacement      !! hydrOXYzine (ATARAX) 25 MG tablet Take 1 tablet (25 mg) by mouth every 6 hours as needed for itching or anxiety (with pain, moderate pain)  Qty: 30 tablet, Refills: 0    Associated Diagnoses: History of arthroplasty of right ankle      oxyCODONE (ROXICODONE) 5 MG tablet Take 1-2 tablets (5-10 mg) by mouth every 3 hours as needed for pain (Moderate to Severe)  Qty: 20 tablet, Refills: 0    Associated Diagnoses: Status post right ankle joint replacement      senna-docusate (SENOKOT-S/PERICOLACE) 8.6-50 MG tablet Take 1-2 tablets by mouth 2 times daily Take while on oral narcotics to prevent or treat constipation.  Qty: 30 tablet, Refills: 0    Comments: While taking narcotics  Associated Diagnoses: Status post right ankle joint replacement       !! - Potential duplicate medications found. Please discuss with provider.      CONTINUE these medications which have NOT CHANGED    Details   Biotin 48587 MCG TABS Take 10,000 mcg by mouth At Bedtime      clonazePAM (KLONOPIN) 1 MG tablet Take 1 mg by mouth daily as needed for anxiety or sleep      Cyanocobalamin (B-12) 1000 MCG TBCR Take 1,000 mcg by mouth At Bedtime       ferrous sulfate (FEROSUL) 325 (65 Fe) MG tablet Take 325 mg by mouth At Bedtime      fluticasone (FLONASE) 50 MCG/ACT nasal spray INSTILL 2 SPRAYS INTO BOTH NOSTRILS DAILY  Qty: 48 mL, Refills: 3    Associated Diagnoses: Allergic rhinitis due to animals      gabapentin (NEURONTIN) 300 MG capsule Take 300 mg by mouth At Bedtime        levothyroxine (SYNTHROID/LEVOTHROID) 50 MCG tablet Take 1 tablet (50 mcg) by mouth daily Hold on file until needed.  Qty: 90 tablet, Refills: 3    Associated Diagnoses: Hypothyroidism (acquired)      MAGNESIUM GLYCINATE PO Take 250 mg by mouth At Bedtime       omeprazole (PRILOSEC) 40 MG DR capsule Take 1 capsule (40 mg) by mouth every morning  Qty: 90 capsule, Refills: 3    Associated Diagnoses: Gastroesophageal reflux disease with esophagitis, unspecified whether hemorrhage      potassium 99 MG TABS Take 1 tablet by mouth At Bedtime      promethazine (PHENERGAN) 25 MG tablet Take 25 mg by mouth every 6 hours as needed       Rhubarb (ESTROVEN COMPLETE PO) Take 1 tablet by mouth At Bedtime       SUMAtriptan (IMITREX) 100 MG tablet Take 1 tablet (100 mg) by mouth at onset of headache for migraine May repeat once in 2 hours if needed  Qty: 27 tablet, Refills: 3    Associated Diagnoses: Migraine without status migrainosus, not intractable, unspecified migraine type      vitamin D3 (CHOLECALCIFEROL) 50 mcg (2000 units) tablet Take 1 tablet by mouth At Bedtime      Zinc Acetate, Oral, (ZINC ACETATE PO) Take 50 mg by mouth At Bedtime                         Follow-Up Care:  Patient should be seen in a [facility] in 10-14 days by the patient's Orthopedic Surgeon/Physician Assistant.  Call [phone] for appointment or questions.    Shalonda Rand PA-C

## 2022-01-29 NOTE — PROGRESS NOTES
01/29/22 1000   Quick Adds   Type of Visit Initial PT Evaluation   Living Environment   People in home parent(s)  (Going to her Mom's house this week.)   Current Living Arrangements house   Home Accessibility stairs to enter home   Number of Stairs, Main Entrance 2   Stair Railings, Main Entrance railing on right side (ascending)   Living Environment Comments Able to stay on one level at Mom's house.   Self-Care   Usual Activity Tolerance excellent   Activity/Exercise/Self-Care Comment Reports independent with all mobility without AD at baseline.   General Information   Onset of Illness/Injury or Date of Surgery 01/28/22   Referring Physician Dr Jose Lanza   Patient/Family Therapy Goals Statement (PT) None stated   Pertinent History of Current Problem (include personal factors and/or comorbidities that impact the POC) Admit for R total ankle.   Existing Precautions/Restrictions weight bearing   Weight-Bearing Status - RLE nonweight-bearing   Pain Assessment   Patient Currently in Pain No   Bed Mobility   Comment (Bed Mobility) Sit<>supine independent.   Transfers   Transfer Safety Comments Sit<>stand SBA bed<>knee walker.   Gait/Stairs (Locomotion)   [place] Level (Gait) supervision;verbal cues   Assistive Device (Gait) walker, knee scooter   Distance in Feet (Required for LE Total Joints) 250'x1   Clinical Impression   Criteria for Skilled Therapeutic Intervention yes, treatment indicated   PT Diagnosis (PT) Impaired functional mobility   Influenced by the following impairments weakness, fatigue   Functional limitations due to impairments gait, stairs   Clinical Presentation Stable/Uncomplicated   Clinical Presentation Rationale Presents as medically diagnosed   Clinical Decision Making (Complexity) low complexity   Therapy Frequency (PT) One time eval and treatment only   Predicted Duration of Therapy Intervention (days/wks) one day   Planned Therapy Interventions (PT) gait training;home exercise  program;stair training;transfer training   Anticipated Equipment Needs at Discharge (PT) other (see comments)  (knee walker)   Risk & Benefits of therapy have been explained evaluation/treatment results reviewed;care plan/treatment goals reviewed;participants included;patient   PT Discharge Planning    PT Discharge Recommendation (DC Rec) home with assist   PT Rationale for DC Rec Discharging to Mom's house with assist as needed.   Total Evaluation Time   Total Evaluation Time (Minutes) 5

## 2022-01-29 NOTE — PLAN OF CARE
Physical Therapy Discharge Summary    Reason for therapy discharge:    All goals and outcomes met, no further needs identified.    Progress towards therapy goal(s). See goals on Care Plan in [facility] electronic health record for goal details.  Goals met    Therapy recommendation(s):    Continue home exercise program.  Continue home amb program.

## 2022-01-29 NOTE — PLAN OF CARE
Problem: Adult Inpatient Plan of Care  Goal: Absence of Hospital-Acquired Illness or Injury  Intervention: Identify and Manage Fall Risk  Recent Flowsheet Documentation  Taken 1/29/2022 0347 by Maribel Griffin RN  Safety Promotion/Fall Prevention: bed alarm on  Taken 1/28/2022 1830 by Maribel Griffin RN  Safety Promotion/Fall Prevention: bed alarm on  Intervention: Prevent and Manage VTE (Venous Thromboembolism) Risk  Recent Flowsheet Documentation  Taken 1/29/2022 0347 by Maribel Griffin RN  VTE Prevention/Management: ambulation promoted  Taken 1/28/2022 1830 by Maribel Griffin RN  VTE Prevention/Management: ambulation promoted     Problem: Adult Inpatient Plan of Care  Goal: Patient-Specific Goal (Individualized)  Outcome: Improving    Pt. Alert and oriented. Denies pain. Able to feel RLE toes. Still unable to wiggle toes. Cap refill less than 3 seconds. Popliteal pulse +2. Voiding good. Using bedside commode.

## 2022-01-29 NOTE — PROGRESS NOTES
"                  [facility] Orthopaedics Progress Note      Post-operative Day: 1 Day Post-Op    Procedure(s):  RIGHT TOTAL ANKLE ARTHROPLASTY      Subjective:    Pain: minimal  Chest pain, SOB:  No      Objective:  Blood pressure 111/58, pulse 65, temperature 97.4  F (36.3  C), temperature source Oral, resp. rate 16, height 1.727 m (5' 8\"), weight 88 kg (194 lb), SpO2 96 %, not currently breastfeeding.    Patient Vitals for the past 24 hrs:   BP Temp Temp src Pulse Resp SpO2   01/29/22 0733 111/58 97.4  F (36.3  C) Oral 65 16 96 %   01/29/22 0001 101/67 98  F (36.7  C) Oral 55 16 95 %   01/28/22 2220 107/68 98  F (36.7  C) Oral 53 18 94 %   01/28/22 1820 108/64 98.1  F (36.7  C) Oral 60 18 98 %   01/28/22 1520 102/63 98.3  F (36.8  C) Oral 63 -- 96 %   01/28/22 1420 108/62 98  F (36.7  C) Oral 80 16 96 %   01/28/22 1320 114/62 97.8  F (36.6  C) Oral 56 16 97 %   01/28/22 1250 106/61 97.6  F (36.4  C) Oral 74 18 94 %   01/28/22 1220 107/62 97.8  F (36.6  C) Oral 75 18 95 %   01/28/22 1205 -- -- -- -- -- 96 %   01/28/22 1200 104/60 -- -- 67 -- 95 %   01/28/22 1130 103/65 -- -- 57 -- 96 %   01/28/22 1100 104/61 -- -- 62 -- 96 %   01/28/22 1030 103/64 -- -- 62 -- 96 %   01/28/22 1003 -- -- -- -- -- 96 %   01/28/22 1000 116/73 97.8  F (36.6  C) Temporal 65 15 95 %   01/28/22 0950 116/70 -- -- 79 16 93 %   01/28/22 0940 108/68 -- -- 89 16 93 %   01/28/22 0930 113/62 -- -- 84 18 94 %   01/28/22 0926 118/67 97.9  F (36.6  C) Temporal -- 16 94 %       Wt Readings from Last 4 Encounters:   01/28/22 88 kg (194 lb)   11/28/21 86.2 kg (190 lb)   02/22/21 84.7 kg (186 lb 12.8 oz)   02/01/21 84.8 kg (187 lb)         Motor function, sensation, and circulation intact including quadriceps, hamstrings, anterior tibialis, EHL and gastrocnemius   No    Wound status: incisions are clean dry and intact. yes  Post op dressing intact. Yes  Calf tenderness: Bilateral  No    Pertinent Labs   Lab Results: personally reviewed.     Recent Labs   Lab " Test 11/06/20  1659   HGB 15.3   HCT 44.9   MCV 97             Plan: Anticoagulation protocol: scoanticoagulationlist2: Aspirin 81 mg BID              Pain medications:  scopainmedication: percocet            Weight bearing status:  NWB to the ankle            Disposition:  Home today             Continue cares and rehabilitation     Report completed by:  Romeo Walton MD  Date: 1/29/2022  Time: 9:21 AM

## 2022-01-29 NOTE — PLAN OF CARE
Problem: Mobility Impairment (Orthopaedic Rehabilitation)  Goal: Optimal Mobility [place] and Safety  Outcome: Improving   Pt pivots well to commode.     Patient vital signs are at baseline: Yes on 1L NC will wean off as able  Patient able to ambulate as they were prior to admission or with assist devices provided by therapies during their stay:  Yes  Patient MUST void prior to discharge:  Yes  Patient able to tolerate oral intake:  Yes  Pain has adequate pain control using Oral analgesics:  Yes    Pain mainly in head today with pt chronic migraine flaring up. [facility] paged to get home sumatriptan ordered. Was successful in decreasing the migraine.    Pt still numb to RLE, unable to wiggle toes. Cap refill great. Popliteal pulse 2+.    Sheeba Aviles RN

## 2022-02-15 ENCOUNTER — APPOINTMENT (OUTPATIENT)
Dept: MRI IMAGING | Facility: CLINIC | Age: 47
End: 2022-02-15
Attending: PHYSICIAN ASSISTANT
Payer: COMMERCIAL

## 2022-02-15 ENCOUNTER — APPOINTMENT (OUTPATIENT)
Dept: CT IMAGING | Facility: CLINIC | Age: 47
End: 2022-02-15
Attending: PHYSICIAN ASSISTANT
Payer: COMMERCIAL

## 2022-02-15 ENCOUNTER — HOSPITAL ENCOUNTER (EMERGENCY)
Facility: CLINIC | Age: 47
Discharge: HOME | End: 2022-02-15
Attending: PHYSICIAN ASSISTANT | Admitting: PHYSICIAN ASSISTANT
Payer: COMMERCIAL

## 2022-02-15 VITALS
OXYGEN SATURATION: 100 % | SYSTOLIC BLOOD PRESSURE: 110 MMHG | HEIGHT: 68 IN | BODY MASS INDEX: 29.4 KG/M2 | HEART RATE: 90 BPM | DIASTOLIC BLOOD PRESSURE: 72 MMHG | WEIGHT: 194 LBS | RESPIRATION RATE: 13 BRPM | TEMPERATURE: 97.9 F

## 2022-02-15 DIAGNOSIS — R20.2 FACIAL TINGLING SENSATION: ICD-10-CM

## 2022-02-15 DIAGNOSIS — R42 DIZZINESS: ICD-10-CM

## 2022-02-15 DIAGNOSIS — R20.2 TINGLING OF UPPER EXTREMITY: ICD-10-CM

## 2022-02-15 DIAGNOSIS — Z96.661 S/P ANKLE JOINT REPLACEMENT, RIGHT: ICD-10-CM

## 2022-02-15 PROBLEM — G43.909 MIGRAINE WITHOUT STATUS MIGRAINOSUS, NOT INTRACTABLE: Status: ACTIVE | Noted: 2020-05-06

## 2022-02-15 PROBLEM — R87.620 ATYPICAL SQUAMOUS CELL CHANGES OF UNDETERMINED SIGNIFICANCE (ASCUS) ON VAGINAL CYTOLOGY: Status: ACTIVE | Noted: 2018-04-01

## 2022-02-15 PROBLEM — K21.9 GASTROESOPHAGEAL REFLUX DISEASE: Status: ACTIVE | Noted: 2022-02-15

## 2022-02-15 PROBLEM — K62.5 BRBPR (BRIGHT RED BLOOD PER RECTUM): Status: ACTIVE | Noted: 2022-02-15

## 2022-02-15 LAB
ALBUMIN SERPL-MCNC: 3.6 G/DL (ref 3.4–5)
ALP SERPL-CCNC: 100 U/L (ref 40–150)
ALT SERPL W P-5'-P-CCNC: 44 U/L (ref 0–50)
ANION GAP SERPL CALCULATED.3IONS-SCNC: 6 MMOL/L (ref 3–14)
AST SERPL W P-5'-P-CCNC: 18 U/L (ref 0–45)
BASOPHILS # BLD AUTO: 0 10E3/UL (ref 0–0.2)
BASOPHILS NFR BLD AUTO: 1 %
BILIRUB SERPL-MCNC: 0.5 MG/DL (ref 0.2–1.3)
BUN SERPL-MCNC: 16 MG/DL (ref 7–30)
CALCIUM SERPL-MCNC: 9.7 MG/DL (ref 8.5–10.1)
CHLORIDE BLD-SCNC: 105 MMOL/L (ref 94–109)
CO2 SERPL-SCNC: 29 MMOL/L (ref 20–32)
CREAT SERPL-MCNC: 1.03 MG/DL (ref 0.52–1.04)
D DIMER PPP FEU-MCNC: 1.63 UG/ML FEU (ref 0–0.5)
EOSINOPHIL # BLD AUTO: 0.6 10E3/UL (ref 0–0.7)
EOSINOPHIL NFR BLD AUTO: 12 %
ERYTHROCYTE [DISTWIDTH] IN BLOOD BY AUTOMATED COUNT: 11.8 % (ref 10–15)
GFR SERPL CREATININE-BSD FRML MDRD: 68 ML/MIN/1.73M2
GLUCOSE BLD-MCNC: 99 MG/DL (ref 70–99)
HCT VFR BLD AUTO: 45.1 % (ref 35–47)
HGB BLD-MCNC: 15.8 G/DL (ref 11.7–15.7)
HOLD SPECIMEN: NORMAL
IMM GRANULOCYTES # BLD: 0 10E3/UL
IMM GRANULOCYTES NFR BLD: 0 %
LYMPHOCYTES # BLD AUTO: 1.2 10E3/UL (ref 0.8–5.3)
LYMPHOCYTES NFR BLD AUTO: 24 %
MCH RBC QN AUTO: 32.1 PG (ref 26.5–33)
MCHC RBC AUTO-ENTMCNC: 35 G/DL (ref 31.5–36.5)
MCV RBC AUTO: 92 FL (ref 78–100)
MONOCYTES # BLD AUTO: 0.5 10E3/UL (ref 0–1.3)
MONOCYTES NFR BLD AUTO: 10 %
NEUTROPHILS # BLD AUTO: 2.6 10E3/UL (ref 1.6–8.3)
NEUTROPHILS NFR BLD AUTO: 53 %
NRBC # BLD AUTO: 0 10E3/UL
NRBC BLD AUTO-RTO: 0 /100
PLATELET # BLD AUTO: 353 10E3/UL (ref 150–450)
POTASSIUM BLD-SCNC: 3.7 MMOL/L (ref 3.4–5.3)
PROT SERPL-MCNC: 7.2 G/DL (ref 6.8–8.8)
RBC # BLD AUTO: 4.92 10E6/UL (ref 3.8–5.2)
SODIUM SERPL-SCNC: 140 MMOL/L (ref 133–144)
TROPONIN I SERPL HS-MCNC: 4 NG/L
TSH SERPL DL<=0.005 MIU/L-ACNC: 1.12 MU/L (ref 0.4–4)
WBC # BLD AUTO: 4.9 10E3/UL (ref 4–11)

## 2022-02-15 PROCEDURE — 71275 CT ANGIOGRAPHY CHEST: CPT

## 2022-02-15 PROCEDURE — 93010 ELECTROCARDIOGRAM REPORT: CPT | Performed by: PHYSICIAN ASSISTANT

## 2022-02-15 PROCEDURE — 99285 EMERGENCY DEPT VISIT HI MDM: CPT | Mod: 25 | Performed by: PHYSICIAN ASSISTANT

## 2022-02-15 PROCEDURE — 36415 COLL VENOUS BLD VENIPUNCTURE: CPT | Performed by: PHYSICIAN ASSISTANT

## 2022-02-15 PROCEDURE — 250N000009 HC RX 250: Performed by: PHYSICIAN ASSISTANT

## 2022-02-15 PROCEDURE — 70553 MRI BRAIN STEM W/O & W/DYE: CPT

## 2022-02-15 PROCEDURE — 84484 ASSAY OF TROPONIN QUANT: CPT | Performed by: PHYSICIAN ASSISTANT

## 2022-02-15 PROCEDURE — 250N000011 HC RX IP 250 OP 636: Performed by: PHYSICIAN ASSISTANT

## 2022-02-15 PROCEDURE — 255N000002 HC RX 255 OP 636: Performed by: PHYSICIAN ASSISTANT

## 2022-02-15 PROCEDURE — 80053 COMPREHEN METABOLIC PANEL: CPT | Performed by: PHYSICIAN ASSISTANT

## 2022-02-15 PROCEDURE — 258N000003 HC RX IP 258 OP 636: Performed by: PHYSICIAN ASSISTANT

## 2022-02-15 PROCEDURE — 85025 COMPLETE CBC W/AUTO DIFF WBC: CPT | Performed by: PHYSICIAN ASSISTANT

## 2022-02-15 PROCEDURE — 84443 ASSAY THYROID STIM HORMONE: CPT | Performed by: PHYSICIAN ASSISTANT

## 2022-02-15 PROCEDURE — 85379 FIBRIN DEGRADATION QUANT: CPT | Performed by: PHYSICIAN ASSISTANT

## 2022-02-15 PROCEDURE — 93005 ELECTROCARDIOGRAM TRACING: CPT | Performed by: PHYSICIAN ASSISTANT

## 2022-02-15 PROCEDURE — 96360 HYDRATION IV INFUSION INIT: CPT | Mod: 59 | Performed by: PHYSICIAN ASSISTANT

## 2022-02-15 PROCEDURE — 96361 HYDRATE IV INFUSION ADD-ON: CPT | Performed by: PHYSICIAN ASSISTANT

## 2022-02-15 PROCEDURE — A9585 GADOBUTROL INJECTION: HCPCS | Performed by: PHYSICIAN ASSISTANT

## 2022-02-15 RX ORDER — GADOBUTROL 604.72 MG/ML
8 INJECTION INTRAVENOUS ONCE
Status: COMPLETED | OUTPATIENT
Start: 2022-02-15 | End: 2022-02-15

## 2022-02-15 RX ORDER — IOPAMIDOL 755 MG/ML
82 INJECTION, SOLUTION INTRAVASCULAR ONCE
Status: COMPLETED | OUTPATIENT
Start: 2022-02-15 | End: 2022-02-15

## 2022-02-15 RX ADMIN — IOPAMIDOL 82 ML: 755 INJECTION, SOLUTION INTRAVENOUS at 13:20

## 2022-02-15 RX ADMIN — GADOBUTROL 8 ML: 604.72 INJECTION INTRAVENOUS at 15:06

## 2022-02-15 RX ADMIN — SODIUM CHLORIDE, POTASSIUM CHLORIDE, SODIUM LACTATE AND CALCIUM CHLORIDE 1000 ML: 600; 310; 30; 20 INJECTION, SOLUTION INTRAVENOUS at 12:19

## 2022-02-15 RX ADMIN — SODIUM CHLORIDE 100 ML: 9 INJECTION, SOLUTION INTRAVENOUS at 13:21

## 2022-02-15 ASSESSMENT — ENCOUNTER SYMPTOMS
FEVER: 0
FATIGUE: 1
WEAKNESS: 1
LIGHT-HEADEDNESS: 1
CARDIOVASCULAR NEGATIVE: 1
VOMITING: 0
DIZZINESS: 1
MUSCULOSKELETAL NEGATIVE: 1
HEADACHES: 1
NAUSEA: 1
NUMBNESS: 1
RESPIRATORY NEGATIVE: 1

## 2022-02-15 ASSESSMENT — MIFFLIN-ST. JEOR: SCORE: 1578.48

## 2022-02-15 NOTE — ED PROVIDER NOTES
History     Chief Complaint   Patient presents with     Dizziness     lightheaded since getting home from work yesterday     HPI  Carina George is a 46 year old female with history of migraines, mild persistent asthma, acquired hypothyroidism, GERD who presents with complaints of fatigue, dizziness, lightheadedness, and tingling to her face and bilateral lower arms since yesterday at 1 PM.  Patient states she was in the car with her significant other and was sitting in the passenger seat when he came to the car with a pizza.  She states the smell of the pizza caused her to feel very nauseous and she started to feel allover weak.  Patient states she developed tingling to the central part of her forehead into her nose as well as to her bilateral lower arms from the elbows down.  Patient states the symptoms have remained constant since yesterday (for the past 24 hours).  She states she got home and was able to lie down but felt a headache coming on and took an Excedrin Migraine which aborted the headache.  She states her neurologic symptoms have remained.  Patient denies any facial droop or difficulties with speech.  She has been ambulating without difficulties.  Patient continues to feel generally weak, fatigued, and vaguely dizzy and lightheaded but also reports a component of vertigo.  Patient states she experienced chest pain yesterday that has since resolved.  She feels short of breath with ambulation which she attributes to being out of shape and with using crutches.  Patient has history of migraines but has not had one in years and denies any atypical neurologic symptoms such as these associate with her migraines.  She states she did not eat at all yesterday because she was feeling ill.  Denies fevers, chills, cough, vomiting, diarrhea, abdominal pain, urinary symptoms, or leg pain/swelling.  Patient is 2-1/2 weeks post-op right ankle joint replacement.  Patient has been taking oxycodone for pain at  nighttime and has been recently okayed to start putting weight on this right leg.  Patient is a smoker.  Patient's postop site is healing well.  She still experiences a fair amount of associated right foot swelling but feels her pain is improving as expected postoperatively.  Patient denies history of hypertension or hyperlipidemia.      Allergies:  No Known Allergies    Problem List:    Patient Active Problem List    Diagnosis Date Noted     BRBPR (bright red blood per rectum) 02/15/2022     Priority: Medium     Gastroesophageal reflux disease 02/15/2022     Priority: Medium     History of arthroplasty of right ankle 01/28/2022     Priority: Medium     Menopausal syndrome (hot flashes) 02/02/2021     Priority: Medium     History of total hysterectomy 02/01/2021     Priority: Medium     hyst for precancerous lesion       Migraine without status migrainosus, not intractable 05/06/2020     Priority: Medium     Hypothyroidism (acquired) 05/09/2018     Priority: Medium     Atypical squamous cell changes of undetermined significance (ASCUS) on vaginal cytology 04/01/2018     Priority: Medium     Formatting of this note might be different from the original.  Total hysterectomy for precancerous lesion   4/2018- ASCUS/HPV+    Plan: Colposcopy       CARDIOVASCULAR SCREENING; LDL GOAL LESS THAN 160 10/31/2010     Priority: Medium     Mild persistent asthma with exacerbation 11/03/2009     Priority: Medium     Carbon monoxide poisoning syndrome 04/16/2009     Priority: Medium     Sinusitis 02/11/2009     Priority: Medium     persistent - but taking chronic afrin - stop.  do prednisone short course, start nasal steroid/antihistamine, f/u w ent if not improving       Tobacco use disorder 02/11/2009     Priority: Medium     w new black out episodes would not repeat chantix - even though it had helped prev.  also woudln't do wellbutrin - seiz risk.  trial patches again       Cough 12/15/2008     Priority: Medium     ongoing  worsening despite zithromax, and restart asthma meds.  treat as asthma exacerb w poss recurrent pneumonia.  then test for underlying asthma.       Viral warts 02/21/2008     Priority: Medium     pared and froze today 2/21/08  Problem list name updated by automated process. Provider to review       Recurrent major depressive disorder, in partial remission (H) 12/07/2007     Priority: Medium     depression with some OCD features - has sign improvement w sx's but still lingering - recommended psychol referral for more ocd coping strategies       Headache 12/07/2007     Priority: Medium     HA getting more frequent, waking her up, getting more severe - check SARA/MRA.  possibly using too much midrin, but associate w urinary ch and wt loss  Problem list name updated by automated process. Provider to review       Other isolated or specific phobias 12/07/2007     Priority: Medium     ativan for calustraphobia probable in MRI machine          Past Medical History:    Past Medical History:   Diagnosis Date     Depressive disorder, not elsewhere classified age 19     Dysplasia of cervix, unspecified      Female stress incontinence      Migraines      Obesity, unspecified      Thyroid disease      Urinary tract infection, site not specified        Past Surgical History:    Past Surgical History:   Procedure Laterality Date     ARTHROPLASTY ANKLE Right 1/28/2022    Procedure: RIGHT TOTAL ANKLE ARTHROPLASTY;  Surgeon: Jose Mckeon MD;  Location: [facility] Main OR     D & C  2002    Missed AB     HYSTERECTOMY, VAGINAL  12/2004    TVH, TVT procedure     [facility] EYE SERVICE OR PROCEDURE OPNP  2002    eye surgery     [facility] OPEN RX ANKLE DISLOCATN+FIXATN  2002    Right       Family History:    Family History   Problem Relation Age of Onset     Cancer Maternal Grandfather         Unsure of type     Neurologic Disorder Father         Brain tumor-? benign     Unknown/Adopted Father      Scleroderma Mother      Kidney Disease Mother  "     Heart Disease Maternal Grandmother      Unknown/Adopted Paternal Grandmother      Prostate Cancer Paternal Grandfather      Breast Cancer Maternal Aunt         in her 30's       Social History:  Marital Status:   [4]  Social History     Tobacco Use     Smoking status: Current Every Day Smoker     Packs/day: 1.00     Years: 15.00     Pack years: 15.00     Types: Cigarettes     Smokeless tobacco: Never Used     Tobacco comment: patient is down to a few ciggarrettes per day as of 12/9/2009, she is taking chantix   Substance Use Topics     Alcohol use: Yes     Comment: 6 drinks per month     Drug use: Yes     Types: Marijuana        Medications:    acetaminophen (TYLENOL) 325 MG tablet  aspirin (ASA) 325 MG EC tablet  Biotin 28236 MCG TABS  clonazePAM (KLONOPIN) 1 MG tablet  Cyanocobalamin (B-12) 1000 MCG TBCR  ferrous sulfate (FEROSUL) 325 (65 Fe) MG tablet  fluticasone (FLONASE) 50 MCG/ACT nasal spray  gabapentin (NEURONTIN) 300 MG capsule  hydrOXYzine (ATARAX) 25 MG tablet  levothyroxine (SYNTHROID/LEVOTHROID) 50 MCG tablet  MAGNESIUM GLYCINATE PO  omeprazole (PRILOSEC) 40 MG DR capsule  potassium 99 MG TABS  promethazine (PHENERGAN) 25 MG tablet  Rhubarb (ESTROVEN COMPLETE PO)  senna-docusate (SENOKOT-S/PERICOLACE) 8.6-50 MG tablet  SUMAtriptan (IMITREX) 100 MG tablet  vitamin D3 (CHOLECALCIFEROL) 50 mcg (2000 units) tablet  Zinc Acetate, Oral, (ZINC ACETATE PO)  oxyCODONE (ROXICODONE) 5 MG tablet          Review of Systems   Constitutional: Positive for fatigue. Negative for fever.   Respiratory: Negative.    Cardiovascular: Negative.    Gastrointestinal: Positive for nausea. Negative for vomiting.   Musculoskeletal: Negative.    Skin: Negative.    Neurological: Positive for dizziness, weakness, light-headedness, numbness and headaches.   All other systems reviewed and are negative.      Physical Exam   BP: 116/69  Pulse: 95  Temp: 97.9  F (36.6  C)  Resp: 16  Height: 172.7 cm (5' 8\")  Weight: 88 kg " (194 lb)  SpO2: 98 %      Physical Exam  Constitutional:       General: She is not in acute distress.     Appearance: Normal appearance. She is well-developed. She is not ill-appearing, toxic-appearing or diaphoretic.   HENT:      Head: Normocephalic and atraumatic. No raccoon eyes, Bill's sign, abrasion or contusion.      Right Ear: Tympanic membrane, ear canal and external ear normal.      Left Ear: Tympanic membrane, ear canal and external ear normal.      Nose: Nose normal. No nasal tenderness, mucosal edema, congestion or rhinorrhea.      Mouth/Throat:      Lips: Pink.      Mouth: Mucous membranes are dry.      Pharynx: Oropharynx is clear. Uvula midline. No pharyngeal swelling, oropharyngeal exudate, posterior oropharyngeal erythema or uvula swelling.      Tonsils: No tonsillar exudate or tonsillar abscesses.   Eyes:      Extraocular Movements: Extraocular movements intact.      Conjunctiva/sclera: Conjunctivae normal.      Pupils: Pupils are equal, round, and reactive to light.   Neck:      Meningeal: Brudzinski's sign and Kernig's sign absent.   Cardiovascular:      Rate and Rhythm: Normal rate and regular rhythm.      Heart sounds: Normal heart sounds.   Pulmonary:      Effort: Pulmonary effort is normal. No respiratory distress.      Breath sounds: Normal breath sounds. No stridor. No wheezing, rhonchi or rales.   Abdominal:      General: There is no distension.      Palpations: Abdomen is soft.      Tenderness: There is no abdominal tenderness. There is no guarding or rebound.   Musculoskeletal:         General: Normal range of motion.      Cervical back: Full passive range of motion without pain, normal range of motion and neck supple. No rigidity.      Comments: Right ankle post-op site appears to be healing well and without evidence of infection.  Mild amount of associated right foot swelling.  No tenderness or swelling of the calf or lower leg.   Lymphadenopathy:      Cervical: No cervical  adenopathy.   Skin:     General: Skin is warm and dry.   Neurological:      General: No focal deficit present.      Mental Status: She is alert and oriented to person, place, and time.      GCS: GCS eye subscore is 4. GCS verbal subscore is 5. GCS motor subscore is 6.      Cranial Nerves: No cranial nerve deficit.      Sensory: Sensation is intact.      Motor: Motor function is intact.   Psychiatric:         Mood and Affect: Mood normal.         Behavior: Behavior is cooperative.         ED Course                 Procedures              EKG Interpretation:      Interpreted by Jodie Taylor PA-C  Symptoms at time of EKG: Dizziness, lightheadedness   Rhythm: normal sinus   Rate: Normal  Axis: Normal  Ectopy: none  Conduction: normal  ST Segments/ T Waves: Non-specific ST-T wave changes  Q Waves: none  Comparison to prior: Unchanged from 4/10/08    Clinical Impression: no acute changes      Results for orders placed or performed during the hospital encounter of 02/15/22 (from the past 24 hour(s))   [place] Draw    Narrative    The following orders were created for panel order [place] Draw.  Procedure                               Abnormality         Status                     ---------                               -----------         ------                     Extra Blue Top Tube[[id number]]                              Final result               Extra Red Top Tube[[id number]]                               Final result               Extra Green Top (Lithium...[[id number]]                      Final result               Extra Purple Top Tube[[id number]]                            Final result                 Please view results for these tests on the individual orders.   Extra Blue Top Tube   Result Value Ref Range    Hold Specimen JIC    Extra Red Top Tube   Result Value Ref Range    Hold Specimen JIC    Extra Green Top (Lithium Heparin) Tube   Result Value Ref Range    Hold Specimen JIC    Extra Purple Top Tube    Result Value Ref Range    Hold Specimen [facility]    CBC with platelets differential    Narrative    The following orders were created for panel order CBC with platelets differential.  Procedure                               Abnormality         Status                     ---------                               -----------         ------                     CBC with platelets and d...[[id number]]  Abnormal            Final result                 Please view results for these tests on the individual orders.   Comprehensive metabolic panel   Result Value Ref Range    Sodium 140 133 - 144 mmol/L    Potassium 3.7 3.4 - 5.3 mmol/L    Chloride 105 94 - 109 mmol/L    Carbon Dioxide (CO2) 29 20 - 32 mmol/L    Anion Gap 6 3 - 14 mmol/L    Urea Nitrogen 16 7 - 30 mg/dL    Creatinine 1.03 0.52 - 1.04 mg/dL    Calcium 9.7 8.5 - 10.1 mg/dL    Glucose 99 70 - 99 mg/dL    Alkaline Phosphatase 100 40 - 150 U/L    AST 18 0 - 45 U/L    ALT 44 0 - 50 U/L    Protein Total 7.2 6.8 - 8.8 g/dL    Albumin 3.6 3.4 - 5.0 g/dL    Bilirubin Total 0.5 0.2 - 1.3 mg/dL    GFR Estimate 68 >60 mL/min/1.73m2   Troponin I   Result Value Ref Range    Troponin I High Sensitivity 4 <54 ng/L   TSH with free T4 reflex   Result Value Ref Range    TSH 1.12 0.40 - 4.00 mU/L   CBC with platelets and differential   Result Value Ref Range    WBC Count 4.9 4.0 - 11.0 10e3/uL    RBC Count 4.92 3.80 - 5.20 10e6/uL    Hemoglobin 15.8 (H) 11.7 - 15.7 g/dL    Hematocrit 45.1 35.0 - 47.0 %    MCV 92 78 - 100 fL    MCH 32.1 26.5 - 33.0 pg    MCHC 35.0 31.5 - 36.5 g/dL    RDW 11.8 10.0 - 15.0 %    Platelet Count 353 150 - 450 10e3/uL    % Neutrophils 53 %    % Lymphocytes 24 %    % Monocytes 10 %    % Eosinophils 12 %    % Basophils 1 %    % Immature Granulocytes 0 %    NRBCs per 100 WBC 0 <1 /100    Absolute Neutrophils 2.6 1.6 - 8.3 10e3/uL    Absolute Lymphocytes 1.2 0.8 - 5.3 10e3/uL    Absolute Monocytes 0.5 0.0 - 1.3 10e3/uL    Absolute Eosinophils 0.6 0.0 - 0.7 10e3/uL     Absolute Basophils 0.0 0.0 - 0.2 10e3/uL    Absolute Immature Granulocytes 0.0 <=0.4 10e3/uL    Absolute NRBCs 0.0 10e3/uL   D dimer quantitative   Result Value Ref Range    D-Dimer Quantitative 1.63 (H) 0.00 - 0.50 ug/mL FEU    Narrative    This D-dimer assay is intended for use in conjunction with a clinical pretest probability assessment model to exclude pulmonary embolism (PE) and deep venous thrombosis (DVT) in outpatients suspected of PE or DVT. The cut-off value is 0.50 ug/mL FEU.   CT Chest Pulmonary Embolism w Contrast    Narrative    CT CHEST PULMONARY EMBOLISM WITH CONTRAST February 15, 2022 1:32 PM    CLINICAL HISTORY: Chest pain, shortness of breath, elevated D-dimer,  postoperative right ankle replacement.    TECHNIQUE: CT angiogram chest during arterial phase injection IV  contrast. 2D and 3D MIP reconstructions were performed by the CT  technologist. Dose reduction techniques were used.   CONTRAST: 82 mL Isovue-370.    COMPARISON: 3/23/2008.    FINDINGS:  ANGIOGRAM CHEST: Pulmonary arteries are normal caliber and negative  for pulmonary emboli. Thoracic aorta is normal in caliber and negative  for dissection.     LUNGS AND PLEURA: Normal.    MEDIASTINUM/AXILLAE: Normal.    CORONARY ARTERY CALCIFICATION: None.    UPPER ABDOMEN: Nonobstructing 2 mm calculus in the left kidney.    MUSCULOSKELETAL: Normal.      Impression    IMPRESSION:  1.  No acute abnormality demonstrated in the chest. Specifically,  there is no evidence of pulmonary embolism.  2.  Incidental note of left nephrolithiasis.    PALMER WILLSON MD         SYSTEM ID:  [id number]   MR Brain w/o & w Contrast    Narrative    MR BRAIN W/O & W CONTRAST 2/15/2022 3:30 PM    INDICATION: dizziness, central forehead and nose tingling, bilateral  lower arm tingling, symptoms for 24 hours, headache yesterday now  resolved  TECHNIQUE: Noncontrast and contrast enhanced MRI of the brain.  CONTRAST: 8 mL Gadavist  COMPARISON: Brain MRI  1/30/2009    FINDINGS:  There is no restricted diffusion. Paranasal sinuses are  free from significant disease. Mastoid air cells appear free from  significant disease. Intraorbital contents are unremarkable.  Ventricles are within normal limits in size for the patient's age.  Intracranial flow voids are intact. There is no mass effect, midline  shift, or extraaxial collection. Signal intensity of the brain  parenchyma is within normal limits for the patient's age. No evidence  for acute or chronic intracranial blood products.      Impression    IMPRESSION:   1.  Unremarkable brain MRI. No acute intracranial finding. No evidence  for recent ischemia, intracranial hemorrhage, or mass.    IDRIS ELAINE MD         SYSTEM ID:  [id number]       Medications   lactated ringers BOLUS 1,000 mL (0 mLs Intravenous Stopped 2/15/22 1610)   iopamidol (ISOVUE-370) solution 82 mL (82 mLs Intravenous Given 2/15/22 1320)   sodium chloride 0.9 % bag 500mL for CT scan flush use (100 mLs As instructed Given 2/15/22 1321)   gadobutrol (GADAVIST) injection 8 mL (8 mLs Intravenous Given 2/15/22 1506)       Assessments & Plan (with Medical Decision Making)     Pt is a 46 year old female with history of migraines, mild persistent asthma, acquired hypothyroidism, GERD who presents with complaints of fatigue, dizziness, lightheadedness, and tingling to her face and bilateral lower arms since yesterday at 1 PM.  Patient states she was in the car with her significant other and was sitting in the passenger seat when he came to the car with a pizza.  She states the smell of the pizza caused her to feel very nauseous and she started to feel allover weak.  Patient states she developed tingling to the central part of her forehead into her nose as well as to her bilateral lower arms from the elbows down.  Patient states the symptoms have remained constant since yesterday (for the past 24 hours).  She states she got home and was able to lie down  but felt a headache coming on and took an Excedrin Migraine which aborted the headache.  She states her neurologic symptoms have remained.  Patient continues to feel generally weak, fatigued, and vaguely dizzy and lightheaded but also reports a component of vertigo.  Patient states she experienced chest pain yesterday that has since resolved.  She feels short of breath with ambulation.  History of migraine headaches but states she has not had one in years and has never had auras or associated neurologic symptoms with them.    Pt is afebrile on arrival.  Exam as above.  Patient is neurologically intact.  Patient's post-operative right ankle site appears to be healing well and without signs of infection.  EKG is unchanged from most recent.  Troponin is normal for age and not elevated.  Comprehensive metabolic panel is unremarkable including normal electrolytes, kidney function, and liver function tests.  TSH is normal.  Normal white count and CBC.  D-dimer is elevated 1.63.  CT scan of the chest was negative for PE.  No acute abnormality noted.  MRI of the brain was obtained given patient's nonspecific symptoms including dizziness and subjective tingling on her face and lower arms.  This MRI was negative for intracranial finding.  No evidence for recent ischemia, hemorrhage, or mass.  Discussed results with patient.  Patient received IV fluids here.  She states she is feeling improved.  Her tingling on her forehead and nose seems to be becoming less noticeable.  We discussed that there is an unclear etiology of her symptoms.  Encouraged continued hydration as dehydration may have been a contributing factor of the dizziness and lightheadedness.  Her vague neurologic symptoms may be a migraine variant of sorts but this is unclear.  Encouraged continued symptomatic treatments at home.  Return precautions were reviewed.  Hand-outs were provided.    Patient was instructed to follow-up with PCP in 3-5 days for continued  care and management or sooner if new or worsening symptoms.  She is to return to the ED for persistent and/or worsening symptoms.  Patient expressed understanding of the diagnosis and plan and was discharged home in good condition.    I have reviewed the nursing notes.    I have reviewed the findings, diagnosis, plan and need for follow up with the patient.    Discharge Medication List as of 2/15/2022  4:10 PM          Final diagnoses:   Dizziness   Facial tingling sensation   Tingling of bilateral lower arms   S/P ankle joint replacement, right       2/15/2022   [facility] EMERGENCY DEPT      Disclaimer:  This note consists of symbols derived from keyboarding, dictation and/or voice recognition software.  As a result, there may be errors in the script that have gone undetected.  Please consider this when interpreting information found in this chart.     Jodie Taylor PA-C  02/15/22 1811       Jodie Taylor PA-C  02/15/22 1812

## 2022-02-16 ENCOUNTER — PATIENT OUTREACH (OUTPATIENT)
Dept: CARE COORDINATION | Facility: CLINIC | Age: 47
End: 2022-02-16
Payer: COMMERCIAL

## 2022-02-16 DIAGNOSIS — Z71.89 OTHER SPECIFIED COUNSELING: ICD-10-CM

## 2022-02-16 NOTE — PROGRESS NOTES
Clinic Care Coordination Contact  [facility]: Post-Discharge Note  SITUATION                                                      Admission:    Admission Date: 02/15/22   Reason for Admission: Dizziness  Discharge:   Discharge Date: 02/15/22  Discharge Diagnosis: Dizziness    BACKGROUND                                                      Per hospital discharge summary and inpatient provider notes:    Carina George is a 46 year old female with history of migraines, mild persistent asthma, acquired hypothyroidism, GERD who presents with complaints of fatigue, dizziness, lightheadedness, and tingling to her face and bilateral lower arms since yesterday at 1 PM.  Patient states she was in the car with her significant other and was sitting in the passenger seat when he came to the car with a pizza.  She states the smell of the pizza caused her to feel very nauseous and she started to feel allover weak.  Patient states she developed tingling to the central part of her forehead into her nose as well as to her bilateral lower arms from the elbows down.  Patient states the symptoms have remained constant since yesterday (for the past 24 hours).  She states she got home and was able to lie down but felt a headache coming on and took an Excedrin Migraine which aborted the headache.  She states her neurologic symptoms have remained.  Patient denies any facial droop or difficulties with speech.  She has been ambulating without difficulties.  Patient continues to feel generally weak, fatigued, and vaguely dizzy and lightheaded but also reports a component of vertigo.  Patient states she experienced chest pain yesterday that has since resolved.  She feels short of breath with ambulation which she attributes to being out of shape and with using crutches.  Patient has history of migraines but has not had one in years and denies any atypical neurologic symptoms such as these associate with her migraines.  She states she  "did not eat at all yesterday because she was feeling ill.  Denies fevers, chills, cough, vomiting, diarrhea, abdominal pain, urinary symptoms, or leg pain/swelling.  Patient is 2-1/2 weeks post-op right ankle joint replacement.  Patient has been taking oxycodone for pain at nighttime and has been recently okayed to start putting weight on this right leg.  Patient is a smoker.  Patient's postop site is healing well.  She still experiences a fair amount of associated right foot swelling but feels her pain is improving as expected postoperatively.  Patient denies history of hypertension or hyperlipidemia.     ASSESSMENT      Enrollment  Primary Care Care Coordination Status: Declined    Discharge Assessment  How are you doing now that you are home?: \"Pretty good\"  How are your symptoms? (Red Flag symptoms escalate to triage hotline per guidelines): Improved  Do you feel your condition is stable enough to be safe at home until your provider visit?: Yes  Does the patient have their discharge instructions? : Yes  Does the patient have questions regarding their discharge instructions? : No  Were you started on any new medications or were there changes to any of your previous medications? : No  Does the patient have all of their medications?: Yes  Do you have questions regarding any of your medications? : No  Do you have all of your needed medical supplies or equipment (DME)?  (i.e. oxygen tank, CPAP, cane, etc.): Yes  Discharge follow-up appointment scheduled within 14 calendar days? : No (Patient will call to schedule their f/u appt. Patient mentioned she was planning to call later today to schedule another appt.)  Is patient agreeable to assistance with scheduling? : Yes    Post-op ([facility] Only)  If the patient had a surgery or procedure, do they have any questions for a nurse?: No    PLAN                                                      Outpatient Plan:    Follow-Ups     1 Call Hernandez Anderson MD (Family " Medicine); For follow-up  2 Go to [facility] Emergency Dept (EMERGENCY MEDICINE); As needed, If symptoms worse    No future appointments.      For any urgent concerns, please contact our 24 hour nurse triage line: [phone] ([phone])       ARIADNA Rosenthal  [phone]  [facility]

## 2022-02-21 ENCOUNTER — TELEPHONE (OUTPATIENT)
Dept: FAMILY MEDICINE | Facility: CLINIC | Age: 47
End: 2022-02-21
Payer: COMMERCIAL

## 2022-02-21 NOTE — LETTER
February 21, 2022      Carina George  [address] [address]        Dear Carina,     Your healthcare team cares about your health. To provide you with the best care,   we have reviewed your chart and based on our findings, we see that you are due to:     An update on the depression and anxiety questionnaires.  These tools help your provider to monitor and manage your symptoms and treatment plan.  Please complete the attached questionnaires and send back to the clinic.    Thank you for choosing [facility] for your healthcare needs. For any questions,   concerns, or to schedule an appointment please contact the clinic.       Healthy Regards,      Your [facility] Care Team

## 2022-02-21 NOTE — TELEPHONE ENCOUNTER
Patient Quality Outreach    Patient is due for the following:   Asthma  -  ACT needed  Depression  -  PHQ-9 Needed    NEXT STEPS:   complete questionnaires    Type of outreach:    Sent letter.      Questions for provider review:    None     Hanna Joe, CMA

## 2022-03-17 ENCOUNTER — TRANSCRIBE ORDERS (OUTPATIENT)
Dept: OTHER | Age: 47
End: 2022-03-17
Payer: COMMERCIAL

## 2022-03-17 DIAGNOSIS — Z98.890 HISTORY OF ANKLE SURGERY: Primary | ICD-10-CM

## 2022-04-06 ENCOUNTER — HOSPITAL ENCOUNTER (OUTPATIENT)
Dept: PHYSICAL THERAPY | Facility: CLINIC | Age: 47
Setting detail: THERAPIES SERIES
Discharge: HOME | End: 2022-04-06
Attending: ORTHOPAEDIC SURGERY
Payer: COMMERCIAL

## 2022-04-06 DIAGNOSIS — Z98.890 HISTORY OF ANKLE SURGERY: ICD-10-CM

## 2022-04-06 PROCEDURE — 97116 GAIT TRAINING THERAPY: CPT | Mod: GP | Performed by: PHYSICAL THERAPIST

## 2022-04-06 PROCEDURE — 97161 PT EVAL LOW COMPLEX 20 MIN: CPT | Mod: GP | Performed by: PHYSICAL THERAPIST

## 2022-04-06 PROCEDURE — 97110 THERAPEUTIC EXERCISES: CPT | Mod: GP | Performed by: PHYSICAL THERAPIST

## 2022-04-14 ENCOUNTER — HOSPITAL ENCOUNTER (OUTPATIENT)
Dept: PHYSICAL THERAPY | Facility: CLINIC | Age: 47
Setting detail: THERAPIES SERIES
Discharge: HOME | End: 2022-04-14
Attending: ORTHOPAEDIC SURGERY
Payer: COMMERCIAL

## 2022-04-14 PROCEDURE — 97110 THERAPEUTIC EXERCISES: CPT | Mod: GP | Performed by: PHYSICAL THERAPIST

## 2022-05-24 DIAGNOSIS — E03.9 HYPOTHYROIDISM (ACQUIRED): ICD-10-CM

## 2022-05-25 RX ORDER — LEVOTHYROXINE SODIUM 50 UG/1
50 TABLET ORAL DAILY
Qty: 90 TABLET | Refills: 2 | OUTPATIENT
Start: 2022-05-25

## 2022-05-25 NOTE — TELEPHONE ENCOUNTER
Left VM to give the nurses a return call at the [facility] [phone] option 2.    Needs an appointment, does not have mychart set up.     Rosa Isela Rice, RN BSN PHN

## 2022-05-25 NOTE — TELEPHONE ENCOUNTER
It appears she transferred care according to the note on 4/9/2021.  Hernandez Anderson MD  Family Medicine

## 2022-05-25 NOTE — TELEPHONE ENCOUNTER
"Requested Prescriptions   Pending Prescriptions Disp Refills    levothyroxine (SYNTHROID/LEVOTHROID) 50 MCG tablet [Pharmacy Med Name: LEVOTHYROXINE 50 MCG TABLET] 90 tablet 2     Sig: Take 1 tablet (50 mcg) by mouth daily Hold on file until needed.        Thyroid Protocol Failed - 5/24/2022  6:14 PM        Failed - Recent (12 mo) or future (30 days) visit within the authorizing provider's specialty     Patient has had an office visit with the authorizing provider or a provider within the authorizing providers department within the previous 12 mos or has a future within next 30 days. See \"Patient Info\" tab in inbasket, or \"Choose Columns\" in Meds & Orders section of the refill encounter.              Passed - Patient is 12 years or older        Passed - Medication is active on med list        Passed - Normal TSH on file in past 12 months     Recent Labs   Lab Test 02/15/22  1144   TSH 1.12                Passed - No active pregnancy on record     If patient is pregnant or has had a positive pregnancy test, please check TSH.          Passed - No positive pregnancy test in past 12 months     If patient is pregnant or has had a positive pregnancy test, please check TSH.                "

## 2022-05-27 DIAGNOSIS — E03.9 HYPOTHYROIDISM (ACQUIRED): ICD-10-CM

## 2022-05-27 RX ORDER — LEVOTHYROXINE SODIUM 50 UG/1
50 TABLET ORAL DAILY
Qty: 90 TABLET | Refills: 0 | Status: SHIPPED | OUTPATIENT
Start: 2022-05-27

## 2022-05-27 RX ORDER — LEVOTHYROXINE SODIUM 50 UG/1
50 TABLET ORAL DAILY
Qty: 90 TABLET | Refills: 0 | Status: SHIPPED | OUTPATIENT
Start: 2022-05-27 | End: 2022-05-27

## 2022-11-01 NOTE — PROGRESS NOTES
Carina George Physical Therapy Ankle Evaluation   04/06/22 0900   General Information   Type of Visit Initial OP Ortho PT Evaluation   Start of Care Date 04/06/22   Referring Physician Jose Mckeon MD   Patient/Family Goals Statement To be able to eventually be able to return to [employer] and [employer] and be able to complete daily tasks without pain and be able to play with her 2 year old grandson.   Orders Evaluate and Treat   Date of Order 03/17/22   Certification Required? No   Medical Diagnosis History of ankle surgery   Surgical/Medical history reviewed Yes   Precautions/Limitations no known precautions/limitations   Weight-Bearing Status - RLE full weight-bearing       Present No   Body Part(s)   Body Part(s) Ankle/Foot   Presentation and Etiology   Pertinent history of current problem (include personal factors and/or comorbidities that impact the POC) Pt comes to therapy today s/p Right ankle replacement performed on 1/28/22. Was initiallly non WB for about 2 weeks then WBAT. Ankle has bene more painful with full WB and continues to swell more by the end of the day. pain rated at 2-3/10 in the morning and 6/10 by the end of the day. Pain intensifies with increased activity. Does wake her up occasionally at night. Not currently taking any medications for the ankle.  Icing intermittently at night. return to work on 4/26 is the goal.    Impairments A. Pain;C. Swelling;D. Decreased ROM;E. Decreased flexibility;F. Decreased strength and endurance;G. Impaired balance;H. Impaired gait;K. Numbness;L. Tingling   Functional Limitations perform required work activities;perform activities of daily living;perform desired leisure / sports activities   Symptom Location right ankle (mostly anterior, some posterior)    How/Where did it occur Other  (s/p R ankle replacement. initial injury in 2002)   Onset date of current episode/exacerbation 01/28/22  (surgery date )   Chronicity  Goal Outcome Evaluation:   Pt with 2L O2 on this morning, had apparently wore it overnight. Yelled out for nurse, asked for urinal. Was able to use it, but had also had incontinent episode on pad. Pad/linens changed. Pt needs assist setting up/opening meal items. Is able to feed self some, but has vision and dexterity issues that require feeding assistance. Eats well when offered food. Pt appears more alert today with oxygen on, MD aware, encouraging IS. Awaiting placement.                Chronic  (chronic ankle pain leading to replacement )   Pain rating (0-10 point scale) Best (/10);Worst (/10)   Best (/10) 3   Worst (/10) 6   Pain quality A. Sharp;B. Dull;C. Aching;E. Shooting   Frequency of pain/symptoms A. Constant   Pain/symptoms exacerbated by A. Sitting;B. Walking;C. Lifting;D. Carrying;G. Certain positions;K. Home tasks   Pain/symptoms eased by C. Rest;H. Cold   Progression of symptoms since onset: Improved   Prior Level of Function   Prior Level of Function-Mobility Independent   Prior Level of Function-ADLs Independent   Current Level of Function   Current Community Support Family/friend caregiver   Patient role/employment history A. Employed   Employment Comments tentative RTW date 4/26   Living environment [facility]   Current equipment-Gait/Locomotion None   Current equipment-ADL None   Fall Risk Screen   Fall screen completed by PT   Have you fallen 2 or more times in the past year? No   Have you fallen and had an injury in the past year? No   Is patient a fall risk? No   Abuse Screen (yes response referral indicated)   Feels Unsafe at Home or Work/School no   Feels Threatened by Someone no   Does Anyone Try to Keep You From Having Contact with Others or Doing Things Outside Your Home? no   Physical Signs of Abuse Present no   System Outcome Measures   Outcome Measures   (LEFS: 24/80)   Ankle/Foot Objective Findings   Side (if bilateral, select both right and left) Right   Observation Pt in no acute distress, oriented   Integumentary surgical incision healing well with some ongoing healing through petros inferiormost aspect. previous scarring present from accident in 2002. No signs of infection or abnormal scarring noted today. distal vascularization is normal with good capilarry refil and 2+ dorsalis pedis pulse. Senstaion is altered rangel-incisionally and mildly inro dorsum of foot over 2nd and 3rd metatarsals    Posture Protracted shoulders   Gait/Locomotion antalgic gait with  decreased time in stance phase on RLE. limited hip extension at terminal stance due to decreased dorsiflexion range   Balance/ Proprioception (Single Leg Stance) unable to test due to pain with SLS   Foot Position In Standing mildly pes planus    Palpation mildly tender rangel-incisionally and through trigger points in gastroc/soleus    Right DF (Knee Ext) AROM -3   Right PF AROM 40   Right Calcanceal Inversion AROM 28   Right Calcaneal Eversion AROM 9   Right DF/Inversion Strength 4-/5   Right DF/Eversion Strength 4-/5   Right PF/Inversion Strength 4-/5   Right PF/Eversion Strength 4-/5   Right Gastroc (in WB) Flexibility impaired   Right Soleus (in WB) Flexibility impaired   Planned Therapy Interventions   Planned Therapy Interventions balance training;gait training;joint mobilization;manual therapy;neuromuscular re-education;ROM;strengthening;stretching   Planned Modality Interventions   Planned Modality Interventions Cryotherapy;Electrical stimulation;Hot packs;Ultrasound   Planned Modality Interventions Comments PRN   Clinical Impression   Criteria for Skilled Therapeutic Interventions Met yes, treatment indicated   PT Diagnosis s/p Right total ankle replacement    Influenced by the following impairments impairments noted in weight bearing, gait pattern, strength, mobility/ROM through operated ankle   Functional limitations due to impairments functional impaitments with prolonged standing, prolonged ambulation, stair navigation, lifting and carrying tasks, and with performing household and most probably occupational tasks though she has not yet returned to work    Clinical Presentation Stable/Uncomplicated   Clinical Presentation Rationale typical post op presentation   Clinical Decision Making (Complexity) Low complexity   Therapy Frequency 2 times/Week   Predicted Duration of Therapy Intervention (days/wks) 10-12 weeks, decreasing as appropriate    Risk & Benefits of therapy have been explained Yes   Patient,  Family & other staff in agreement with plan of care Yes   Clinical Impression Comments Due to longterm ankle discomfort and impaired mobility/strength from initial accident in 2002 the course of rehabilitation may be prolonged or slowed due to long standing impairments    Education Assessment   Preferred Learning Style Listening;Demonstration;Pictures/video   Barriers to Learning No barriers   ORTHO GOALS   PT Ortho Eval Goals 1;2;3;4;5   Ortho Goal 1   Goal Identifier STG1   Goal Description Pt will be independent with HEP to ensure adequate volume of therapeutic exercise is performed for return to previous level of function    Target Date 04/27/22   Ortho Goal 2   Goal Identifier LTG1   Goal Description Pt will normalize gait pattern without assistive device    Target Date 05/18/22   Ortho Goal 3   Goal Identifier LTG2   Goal Description Pt will display age appropriate single leg stance on RLE to ensure safety and stability for all single leg functional tasks   Target Date 06/15/22   Ortho Goal 4   Goal Identifier LTG3   Goal Description Pt will be able to return to light hiking and [place] on uneven terrain with ankle pain no greater than 2/10   Target Date 06/15/22   Ortho Goal 5   Goal Identifier LTG4   Goal Description Pt will be able to perform all self care/household/and occupational tasks with pain no greater than 1/10   Target Date 06/29/22   Total Evaluation Time   PT Eval, Low Complexity Minutes (38500) 15

## 2023-08-07 NOTE — PROGRESS NOTES
04/14/22 1200   Appointment Info   Signing clinician's name / credentials Blayne High, PT, DPT   Visits Used 2   Subjective Report   Subjective Report Pt reports she was sore after last visit for a few days bu tshe has been doing well since. feels that she is walking better   Therapeutic Procedure/Exercise   Therapeutic Procedures: strength, endurance, ROM, flexibillity minutes (97888) 25   Skilled Intervention implementation of therapeutic exercise to address impairments   Patient Response/Progress tolerated well   Manual Therapy   Manual Therapy: Mobilization, MFR, MLD, friction massage minutes (21771) 10   Skilled Intervention implemtation of light joing mobilization and stretchign to address mobility deficits    Patient Response/Progress tolerated well    Treatment Detail joint mobilizations through MT's.   passive stretching ot toes into flexion/extension and gentle stretchign of right ankle all planes to tolerance    Education   Learner/Method Patient   Plan   Homework PTRx   Home program [id number]   Plan for next session progress mobility and HEP as able    Comments   Comments gait pattern much improved since evaluation. toerlated progressions well today   Medicare Claim Information   Medical Diagnosis History of ankle surgery   PT Diagnosis s/p Right total ankle replacement    Ortho Goal 1   Goal Identifier STG1   Goal Description Pt will be independent with HEP to ensure adequate volume of therapeutic exercise is performed for return to previous level of function    Target Date 04/27/22   Ortho Goal 2   Goal Identifier LTG1   Goal Description Pt will normalize gait pattern without assistive device    Target Date 05/18/22   Ortho Goal 3   Goal Identifier LTG2   Goal Description Pt will display age appropriate single leg stance on RLE to ensure safety and stability for all single leg functional tasks   Target Date 06/15/22   Ortho Goal 4   Goal Identifier LTG3   Goal Description Pt will be able to return  to light hiking and [place] on uneven terrain with ankle pain no greater than 2/10   Target Date 06/15/22   Ortho Goal 5   Goal Identifier LTG4   Goal Description Pt will be able to perform all self care/household/and occupational tasks with pain no greater than 1/10   Target Date 06/29/22         DISCHARGE  Reason for Discharge: Patient has failed to schedule further appointments.     Discharge Plan: Patient to continue home program.    Referring Provider:  Jose Lanza

## (undated) DEVICE — SUTURE VICRYL+ 2-0 27IN CT-1 UND VCP259H

## (undated) DEVICE — DRESSING XEROFORM PETROLATUM 5X9 33605

## (undated) DEVICE — BANDAGE ELASTIC 6X550 LF DBL 593-96LF

## (undated) DEVICE — CAST PADDING 4" STERILE 9044S

## (undated) DEVICE — CUSTOM PACK TOTAL KNEE SOP5BTKHEC

## (undated) DEVICE — Device

## (undated) DEVICE — SOL NACL 0.9% IRRIG 1000ML BOTTLE 2F7124

## (undated) DEVICE — DRSG GAUZE 4X4" TRAY 6939

## (undated) DEVICE — DRSG ABD TNDRSRB WET PRUF 8IN X 10IN STRL  9194A

## (undated) DEVICE — GLOVE BIOGEL PI ULTRATOUCH G SZ 7.0 42170

## (undated) DEVICE — PLATE GROUNDING ADULT W/CORD 9165L

## (undated) DEVICE — TOURNIQUET SGL  BLADDER 30"X4" BLUE 5921030135

## (undated) DEVICE — SUTURE VICRYL+ 0 27IN CT-1 UND VCP260H

## (undated) DEVICE — GLOVE BIOGEL PI ULTRATOUCH G SZ 8.0 42180

## (undated) DEVICE — SOL WATER IRRIG 1000ML BOTTLE 2F7114

## (undated) DEVICE — CLOSURE DEVICE ZIPLINE 16CM ZIP16 PS1160

## (undated) DEVICE — GLOVE BIOGEL INDICATOR 7.5 LF 41675

## (undated) DEVICE — DRAPE MINI C-ARM INSIGHT2 CF-5423

## (undated) DEVICE — SOLIDIFIER FLD 3.2OZ  CL-FREE F/ BIOHZRD WASTE 3000ML CANIST

## (undated) DEVICE — GOWN IMPERVIOUS BREATHABLE SMART XLG 89045

## (undated) DEVICE — SUCTION CANISTER MEDIVAC LINER 3000ML W/LID 65651-530

## (undated) DEVICE — SUCTION MANIFOLD NEPTUNE 2 SYS 1 PORT 702-025-000

## (undated) DEVICE — GLOVE BIOGEL PI INDICATOR 8.0 LF 41680

## (undated) DEVICE — CAST PLASTER SPLINT XTRA FAST 5X30" 7392

## (undated) RX ORDER — ONDANSETRON 2 MG/ML
INJECTION INTRAMUSCULAR; INTRAVENOUS
Status: DISPENSED
Start: 2022-01-28

## (undated) RX ORDER — DEXAMETHASONE SODIUM PHOSPHATE 10 MG/ML
INJECTION, EMULSION INTRAMUSCULAR; INTRAVENOUS
Status: DISPENSED
Start: 2022-01-28